# Patient Record
Sex: FEMALE | Race: WHITE | Employment: FULL TIME | URBAN - METROPOLITAN AREA
[De-identification: names, ages, dates, MRNs, and addresses within clinical notes are randomized per-mention and may not be internally consistent; named-entity substitution may affect disease eponyms.]

---

## 2017-04-11 ENCOUNTER — TELEPHONE (OUTPATIENT)
Dept: OBGYN | Facility: CLINIC | Age: 37
End: 2017-04-11

## 2017-04-11 DIAGNOSIS — R30.9 PAIN WITH URINATION: ICD-10-CM

## 2017-04-11 DIAGNOSIS — R30.9 PAIN WITH URINATION: Primary | ICD-10-CM

## 2017-04-11 LAB
ALBUMIN UR-MCNC: NEGATIVE MG/DL
APPEARANCE UR: CLEAR
BILIRUB UR QL STRIP: NEGATIVE
COLOR UR AUTO: YELLOW
GLUCOSE UR STRIP-MCNC: NEGATIVE MG/DL
HGB UR QL STRIP: ABNORMAL
KETONES UR STRIP-MCNC: NEGATIVE MG/DL
LEUKOCYTE ESTERASE UR QL STRIP: NEGATIVE
NITRATE UR QL: NEGATIVE
PH UR STRIP: 5.5 PH (ref 5–7)
RBC #/AREA URNS AUTO: ABNORMAL /HPF (ref 0–2)
SP GR UR STRIP: <=1.005 (ref 1–1.03)
URN SPEC COLLECT METH UR: ABNORMAL
UROBILINOGEN UR STRIP-ACNC: 0.2 EU/DL (ref 0.2–1)
WBC #/AREA URNS AUTO: ABNORMAL /HPF (ref 0–2)

## 2017-04-11 PROCEDURE — 81001 URINALYSIS AUTO W/SCOPE: CPT | Performed by: OBSTETRICS & GYNECOLOGY

## 2017-04-11 NOTE — PROGRESS NOTES
Results discussed directly with patient in clinic. Further details documented in the note.     Esme Rodríguez MD

## 2017-04-17 ENCOUNTER — OFFICE VISIT (OUTPATIENT)
Dept: FAMILY MEDICINE | Facility: CLINIC | Age: 37
End: 2017-04-17

## 2017-04-17 VITALS
BODY MASS INDEX: 25.48 KG/M2 | HEIGHT: 71 IN | SYSTOLIC BLOOD PRESSURE: 114 MMHG | TEMPERATURE: 98.7 F | RESPIRATION RATE: 20 BRPM | DIASTOLIC BLOOD PRESSURE: 70 MMHG | WEIGHT: 182 LBS | HEART RATE: 72 BPM

## 2017-04-17 DIAGNOSIS — B96.89 BACTERIAL VAGINOSIS: ICD-10-CM

## 2017-04-17 DIAGNOSIS — N76.0 BACTERIAL VAGINOSIS: ICD-10-CM

## 2017-04-17 DIAGNOSIS — J06.9 UPPER RESPIRATORY TRACT INFECTION, UNSPECIFIED TYPE: ICD-10-CM

## 2017-04-17 DIAGNOSIS — L29.9 CHRONIC PRURITUS: ICD-10-CM

## 2017-04-17 DIAGNOSIS — N30.00 ACUTE CYSTITIS WITHOUT HEMATURIA: Primary | ICD-10-CM

## 2017-04-17 LAB
ALBUMIN (URINE): ABNORMAL MG/DL
APPEARANCE UR: ABNORMAL
BACTERIA (WET PREP): ABNORMAL
BACTERIA, UR: ABNORMAL
BILIRUB UR QL: ABNORMAL
CASTS/LPF: ABNORMAL
CLUE CELLS: ABNORMAL
COLOR UR: YELLOW
EP/HPF: ABNORMAL
ERYTHROCYTE [DISTWIDTH] IN BLOOD BY AUTOMATED COUNT: 11.3 %
GLUCOSE URINE: ABNORMAL MG/DL
HCT VFR BLD AUTO: 38.2 % (ref 35–47)
HEMOGLOBIN: 12.1 G/DL (ref 11.7–15.7)
HGB UR QL: ABNORMAL
KETONES UR QL: ABNORMAL MG/DL
LEUKOCYTE ESTERASE - QUEST: ABNORMAL
MCH RBC QN AUTO: 28.4 PG (ref 26–33)
MCHC RBC AUTO-ENTMCNC: 31.7 G/DL (ref 31–36)
MCV RBC AUTO: 89.7 FL (ref 78–100)
MISC.: ABNORMAL
NITRITE UR QL STRIP: ABNORMAL
PH FLUID SOURCE: 6 (ref 3.5–4.5)
PH UR STRIP: 7 PH (ref 5–7)
PLATELET COUNT - QUEST: 310 10^9/L (ref 150–375)
PMNS (WET PREP): ABNORMAL
RBC # BLD AUTO: 4.26 10*12/L (ref 3.8–5.2)
RBC, UR MICRO: ABNORMAL (ref ?–2)
SP. GRAVITY: 1.01
TRICHOMONAS VAGINALS: ABNORMAL
UROBILINOGEN UR QL STRIP: 0.2 EU/DL (ref 0.2–1)
WBC # BLD AUTO: 8 10*9/L (ref 4–11)
WBC, UR MICRO: ABNORMAL (ref ?–2)
YEAST CELLS: ABNORMAL

## 2017-04-17 PROCEDURE — 84443 ASSAY THYROID STIM HORMONE: CPT | Mod: 90 | Performed by: PHYSICIAN ASSISTANT

## 2017-04-17 PROCEDURE — 87389 HIV-1 AG W/HIV-1&-2 AB AG IA: CPT | Mod: 90 | Performed by: PHYSICIAN ASSISTANT

## 2017-04-17 PROCEDURE — 36415 COLL VENOUS BLD VENIPUNCTURE: CPT | Performed by: PHYSICIAN ASSISTANT

## 2017-04-17 PROCEDURE — 80076 HEPATIC FUNCTION PANEL: CPT | Mod: 90 | Performed by: PHYSICIAN ASSISTANT

## 2017-04-17 PROCEDURE — 85027 COMPLETE CBC AUTOMATED: CPT | Performed by: PHYSICIAN ASSISTANT

## 2017-04-17 PROCEDURE — 99214 OFFICE O/P EST MOD 30 MIN: CPT | Performed by: PHYSICIAN ASSISTANT

## 2017-04-17 PROCEDURE — 80048 BASIC METABOLIC PNL TOTAL CA: CPT | Mod: 90 | Performed by: PHYSICIAN ASSISTANT

## 2017-04-17 PROCEDURE — 81001 URINALYSIS AUTO W/SCOPE: CPT | Performed by: PHYSICIAN ASSISTANT

## 2017-04-17 PROCEDURE — 87210 SMEAR WET MOUNT SALINE/INK: CPT | Performed by: PHYSICIAN ASSISTANT

## 2017-04-17 RX ORDER — NITROFURANTOIN 25; 75 MG/1; MG/1
100 CAPSULE ORAL 2 TIMES DAILY
Qty: 14 CAPSULE | Refills: 0 | Status: SHIPPED | OUTPATIENT
Start: 2017-04-17 | End: 2017-05-11

## 2017-04-17 RX ORDER — METRONIDAZOLE 500 MG/1
500 TABLET ORAL 2 TIMES DAILY
Qty: 14 TABLET | Refills: 0 | Status: SHIPPED | OUTPATIENT
Start: 2017-04-17 | End: 2017-05-11

## 2017-04-17 NOTE — PROGRESS NOTES
"Chief Complaint   Patient presents with     UTI     SUBJECTIVE  Marietta Cruz in for a possible UTI.  Symptoms started 1 1/2 week(s) ago. Symptoms include  dysuria, urgency, frequency, burning and cloudiness.   She denies fevers. She does have a history of uti's.  Today noticed some discharge.       Urgency continues daily, slight pain with urnation.       March menses for a week March 10th.     URI sx started a week ago, sore throat, nasal congestion. Improving      Of note 1 year of idiopathic itching of legs. Zantac helps but it makes her stomach feel uneasy.  No new exposures.  Itching to the point of bleeding.  OTC Antihistamines don't help.        Current Outpatient Prescriptions   Medication     norethindrone (MICRONOR) 0.35 MG per tablet     No current facility-administered medications for this visit.        Patient Active Problem List    Diagnosis Date Noted     Migraine aura without headache 11/17/2016     Priority: Medium     ACP (advance care planning) 06/30/2014     Priority: Medium     Advance Care Planning 7/30/2015: ACP Review and Resources Provided:  Reviewed chart for advance care plan.  Marietta Cruz has no plan or code status on file. Discussed available resources and provided with information. Confirmed code status reflects current choices pending further ACP discussions.  Confirmed/documented legally designated decision maker(s). [facility]     Priority: Low     State Tier Level:  Tier 1  Status:  n/a  Care Coordinator:      See Letters for [facility] Care Plan             OBJECTIVE:    /70  Pulse 72  Temp 98.7  F (37.1  C) (Oral)  Resp 20  Ht 1.81 m (5' 11.25\")  Wt 82.6 kg (182 lb)  LMP 03/10/2017  BMI 25.21 kg/m2    Patient is a 36 year old female, in no acute distress. Vitals noted   Head: Normocephalic, atraumatic.  Eyes: Conjunctiva clear, no discharge  Ears: External ears and TMs normal BL.  Nose: Nasal mucosa pink " and moist. No discharge.  Mouth / Throat: Mucous membranes moist. Normal dentition.  Pharynx non-erythematous, no exudates.   Neck: Supple, No thyromegaly or nodules. No lymphadenopathy.  Cardiac:  Normal rhythm and rate, no murmurs, rubs or gallops.  Lungs: clear to auscultation bilaterally; no wheezes, crackles or rhonchi  Abdomen:  Bowel sounds normal. Soft,  not distended, no masses, no hepatosplenomegaly, non-tender.  There is CVA tenderness. - mild on the left  Skin: legs there are discrete excortiated papules < 5 per leg    Labs Resulted Today:   Results for orders placed or performed in visit on 04/17/17   HCL  Urinalysis, Routine (BFP)   Result Value Ref Range    Color Urine Yellow     Appearance Urine Slightly Cloudy (A)     Glucose Urine Neg neg mg/dL    Bilirubin Urine Neg neg    Ketones Urine Neg neg mg/dL    Specific Gravity 1.010     Blood Urine Mod (A) neg    pH Urine 7.0 5.0 - 7.0 pH    Albumin Urine neg neg - neg mg/dL    Urobilinogen Urine 0.2 0.2 - 1.0 EU/dL    Nitrite Urine Neg NEG    Leukocyte Esterase MOD (A) neg - neg    Wbc, Urine Micro 8-15 (A) neg - 2    RBC Micro Urine 2-4 (A) neg - 2    EP/HPF small     Bacteria Urine small (A) neg - neg    Casts/LPF neg     Miscellaneous clumping of wbc's (A)    A WET PREP (BFP)   Result Value Ref Range    Trichomonas Vaginals neg     yeast cells neg     PMNs Wet Prep MOD (A)     Clue cells POS (A)     Bacteria (Wet Prep) HEAVY (A)     pH Fluid Source 6.0 (A) 3.5 - 4.5           ASSESSMENT/PLAN:    (N30.00) Acute cystitis without hematuria  (primary encounter diagnosis)  Plan: nitrofurantoin, macrocrystal-monohydrate,         (MACROBID) 100 MG capsule  AE reviewed    (J06.9) Upper respiratory tract infection, unspecified type  Call if sinusitis not gone 1 week for abx    (L29.9) Chronic pruritus  Plan: VENOUS COLLECTION, HEMOGRAM/PLATELET (BFP), TSH        with free T4 reflex ([facility]), HIV 1/2 Agn Melany         4th Gen w Reflex ([facility]), HEPATIC PANEL          ([facility]), BASIC METABOLIC PANEL ([facility])  Advised chest xray.  If labs nl will RTC for chest xray.  If neg will have her see Dr. Diaz    (N76.0,  B96.89) Bacterial vaginosis  Plan: metroNIDAZOLE (FLAGYL) 500 MG tablet        No alcohol, no intercourse  Take AFTER MACROBID TX         Marietta Cruz understands and agrees with the plan.  Nichelle Luna

## 2017-04-17 NOTE — NURSING NOTE
Mohania SHERRY Cruz is here for possible bladder issues. Had a UA done last week and was normal, but still has pain and frequency at times when urinating.  Questioned patient about current smoking habits.  Pt. has never smoked.  Body mass index is 25.89 kg/(m^2).  PULSE regular  My Chart: active  CLASSIFICATION OF OVERWEIGHT AND OBESITY BY BMI                        Obesity Class           BMI(kg/m2)  Underweight                                    < 18.5  Normal                                         18.5-24.9  Overweight                                     25.0-29.9  OBESITY                     I                  30.0-34.9                             II                 35.0-39.9  EXTREME OBESITY             III                >40                            Patient's  BMI Body mass index is 25.21 kg/(m^2).  http://hin.nhlbi.nih.gov/menuplanner/menu.cgi    Pre-visit planning  Immunizations - up to date  Colonoscopy -   Mammogram -   Asthma -   PHQ9 -    MIRI-7 -

## 2017-04-17 NOTE — PATIENT INSTRUCTIONS
1. Macrobid (nitrofurantoin) 2 x a day for 7 day for UTI    Then    2. Metronidazole 2 x a day for 7 day.  NO ALCOHOL  NO INTERCOURSE

## 2017-04-17 NOTE — MR AVS SNAPSHOT
After Visit Summary   4/17/2017    Marietta Cruz    MRN: [id number]           Patient Information     Date Of Birth          1980        Visit Information        Provider Department      4/17/2017 2:45 PM Nichelle Luna PA [facility]        Today's Diagnoses     Acute cystitis without hematuria    -  1    Upper respiratory tract infection, unspecified type        Chronic pruritus        Bacterial vaginosis          Care Instructions    1. Macrobid (nitrofurantoin) 2 x a day for 7 day for UTI    Then    2. Metronidazole 2 x a day for 7 day.  NO ALCOHOL  NO INTERCOURSE        Follow-ups after your visit        Who to contact     If you have questions or need follow up information about today's clinic visit or your schedule please contact [facility] directly at [phone].  Normal or non-critical lab and imaging results will be communicated to you by Eko Deviceshart, letter or phone within 4 business days after the clinic has received the results. If you do not hear from us within 7 days, please contact the clinic through E[employer] Deviceshart or phone. If you have a critical or abnormal lab result, we will notify you by phone as soon as possible.  Submit refill requests through [employer] or call your pharmacy and they will forward the refill request to us. Please allow 3 business days for your refill to be completed.          Additional Information About Your Visit        MyChart Information     [employer] gives you secure access to your electronic health record. If you see a primary care provider, you can also send messages to your care team and make appointments. If you have questions, please call your primary care clinic.  If you do not have a primary care provider, please call [phone] and they will assist you.        Care EveryWhere ID     This is your Care EveryWhere ID. This could be used by other organizations to access your [facility]  "records  [id number]        Your Vitals Were     Pulse Temperature Respirations Height Last Period BMI (Body Mass Index)    72 98.7  F (37.1  C) (Oral) 20 1.81 m (5' 11.25\") 03/10/2017 25.21 kg/m2       Blood Pressure from Last 3 Encounters:   04/17/17 114/70   11/17/16 110/74   06/08/16 102/70    Weight from Last 3 Encounters:   04/17/17 82.6 kg (182 lb)   11/17/16 81.6 kg (180 lb)   06/08/16 79.4 kg (175 lb)              We Performed the Following     A WET PREP (BFP)     BASIC METABOLIC PANEL ([facility])     HCL  Urinalysis, Routine (BFP)     HEMOGRAM/PLATELET (BFP)     HEPATIC PANEL ([facility])     HIV 1/2 Agn Melany 4th Gen w Reflex ([facility])     TSH with free T4 reflex ([facility])     VENOUS COLLECTION          Today's Medication Changes          These changes are accurate as of: 4/17/17  3:47 PM.  If you have any questions, ask your nurse or doctor.               Start taking these medicines.        Dose/Directions    metroNIDAZOLE 500 MG tablet   Commonly known as:  FLAGYL   Used for:  Bacterial vaginosis   Started by:  Nichelle Luna PA        Dose:  500 mg   Take 1 tablet (500 mg) by mouth 2 times daily   Quantity:  14 tablet   Refills:  0       nitrofurantoin (macrocrystal-monohydrate) 100 MG capsule   Commonly known as:  MACROBID   Used for:  Acute cystitis without hematuria   Started by:  Nichelle Luna PA        Dose:  100 mg   Take 1 capsule (100 mg) by mouth 2 times daily   Quantity:  14 capsule   Refills:  0            Where to get your medicines      These medications were sent to [facility] [place], [place] - [address]  [address] [facility] [address]     Phone:  [phone]     metroNIDAZOLE 500 MG tablet    nitrofurantoin (macrocrystal-monohydrate) 100 MG capsule                Primary Care Provider Office Phone # Fax #    Spencer Lott [phone]       [facility] [address]  [facility] [address]        Thank you!     Thank you for choosing [facility]  for your care. Our goal is always to provide you with excellent care. Hearing back from our patients is one way we can continue to improve our services. Please take a few minutes to complete the written survey that you may receive in the mail after your visit with us. Thank you!             Your Updated Medication List - Protect others around you: Learn how to safely use, store and throw away your medicines at www.disposemymeds.org.          This list is accurate as of: 4/17/17  3:47 PM.  Always use your most recent med list.                   Brand Name Dispense Instructions for use    metroNIDAZOLE 500 MG tablet    FLAGYL    14 tablet    Take 1 tablet (500 mg) by mouth 2 times daily       nitrofurantoin (macrocrystal-monohydrate) 100 MG capsule    MACROBID    14 capsule    Take 1 capsule (100 mg) by mouth 2 times daily       norethindrone 0.35 MG per tablet    MICRONOR    84 tablet    Take 1 tablet (0.35 mg) by mouth daily

## 2017-04-18 LAB
ALBUMIN SERPL-MCNC: 3.7 G/DL (ref 3.6–5.1)
ALBUMIN/GLOB SERPL: 1.3 (CALC) (ref 1–2.5)
ALP SERPL-CCNC: 45 U/L (ref 33–115)
ALT SERPL-CCNC: 21 U/L (ref 6–29)
AST SERPL-CCNC: 22 U/L (ref 10–30)
BILIRUB SERPL-MCNC: 0.6 MG/DL (ref 0.2–1.2)
BILIRUBIN, DIRECT: 0.1 MG/DL (ref 0–0.5)
BILIRUBIN,INDIRECT: 0.5 MG/DL (CALC) (ref 0.2–1.2)
BUN SERPL-MCNC: 9 MG/DL (ref 7–25)
BUN/CREATININE RATIO: NORMAL (CALC) (ref 6–22)
CALCIUM SERPL-MCNC: 8.7 MG/DL (ref 8.6–10.2)
CHLORIDE SERPLBLD-SCNC: 101 MMOL/L (ref 98–110)
CO2 SERPL-SCNC: 25 MMOL/L (ref 20–31)
CREAT SERPL-MCNC: 0.98 MG/DL (ref 0.5–1.1)
EGFR AFRICAN AMERICAN - QUEST: 85 ML/MIN/1.73M2
GFR SERPL CREATININE-BSD FRML MDRD: 74 ML/MIN/1.73M2
GLOBULIN, CALCULATED - QUEST: 2.9 G/DL (CALC) (ref 1.9–3.7)
GLUCOSE - QUEST: 89 MG/DL (ref 65–99)
HIV 1/2 AGN ABY 4TH GEN WITH REFLEX: NORMAL
POTASSIUM SERPL-SCNC: 3.9 MMOL/L (ref 3.5–5.3)
PROT SERPL-MCNC: 6.6 G/DL (ref 6.1–8.1)
SODIUM SERPL-SCNC: 137 MMOL/L (ref 135–146)
TSH SERPL-ACNC: 1.8 MIU/L

## 2017-05-11 ENCOUNTER — OFFICE VISIT (OUTPATIENT)
Dept: FAMILY MEDICINE | Facility: CLINIC | Age: 37
End: 2017-05-11

## 2017-05-11 VITALS
SYSTOLIC BLOOD PRESSURE: 98 MMHG | DIASTOLIC BLOOD PRESSURE: 72 MMHG | BODY MASS INDEX: 25.48 KG/M2 | HEIGHT: 71 IN | RESPIRATION RATE: 20 BRPM | WEIGHT: 182 LBS

## 2017-05-11 DIAGNOSIS — L29.9 PRURITIC DISORDER: Primary | ICD-10-CM

## 2017-05-11 PROCEDURE — 71020 XR CHEST 2 VW: CPT | Performed by: PHYSICIAN ASSISTANT

## 2017-05-11 PROCEDURE — 99213 OFFICE O/P EST LOW 20 MIN: CPT | Performed by: PHYSICIAN ASSISTANT

## 2017-05-11 NOTE — MR AVS SNAPSHOT
"              After Visit Summary   5/11/2017    Marietta Cruz    MRN: [id number]           Patient Information     Date Of Birth          1980        Visit Information        Provider Department      5/11/2017 5:00 PM Nichelle Luna PA [facility]        Today's Diagnoses     Pruritic disorder    -  1       Follow-ups after your visit        Who to contact     If you have questions or need follow up information about today's clinic visit or your schedule please contact [facility] directly at [phone].  Normal or non-critical lab and imaging results will be communicated to you by Blackberryhart, letter or phone within 4 business days after the clinic has received the results. If you do not hear from us within 7 days, please contact the clinic through Blackberryhart or phone. If you have a critical or abnormal lab result, we will notify you by phone as soon as possible.  Submit refill requests through [employer]ree or call your pharmacy and they will forward the refill request to us. Please allow 3 business days for your refill to be completed.          Additional Information About Your Visit        MyChart Information     [employer] gives you secure access to your electronic health record. If you see a primary care provider, you can also send messages to your care team and make appointments. If you have questions, please call your primary care clinic.  If you do not have a primary care provider, please call [phone] and they will assist you.        Care EveryWhere ID     This is your Care EveryWhere ID. This could be used by other organizations to access your [facility] medical records  [id number]        Your Vitals Were     Respirations Height Last Period BMI (Body Mass Index)          20 1.81 m (5' 11.25\") 03/10/2017 25.21 kg/m2         Blood Pressure from Last 3 Encounters:   05/11/17 98/72   04/17/17 114/70   11/17/16 110/74    Weight from Last 3 Encounters: "   05/11/17 82.6 kg (182 lb)   04/17/17 82.6 kg (182 lb)   11/17/16 81.6 kg (180 lb)              We Performed the Following     XR Chest 2 Views        Primary Care Provider Office Phone # Fax #    Spencer Lott [phone]       [facility] [address] [facility] [address]  [facility] [address]        Thank you!     Thank you for choosing [facility]  for your care. Our goal is always to provide you with excellent care. Hearing back from our patients is one way we can continue to improve our services. Please take a few minutes to complete the written survey that you may receive in the mail after your visit with us. Thank you!             Your Updated Medication List - Protect others around you: Learn how to safely use, store and throw away your medicines at www.disposemymeds.org.          This list is accurate as of: 5/11/17 11:59 PM.  Always use your most recent med list.                   Brand Name Dispense Instructions for use    norethindrone 0.35 MG per tablet    MICRONOR    84 tablet    Take 1 tablet (0.35 mg) by mouth daily

## 2017-05-11 NOTE — PROGRESS NOTES
SUBJECTIVE:                                                    Marietta Cruz is a 36 year old female who presents to clinic today for the following health issues:    See last OV.  Chronic pruritis of lower legs that started last week in April from thighs to ankles.  She additionally had sensitivity to suprapubic area - that has resolved.  Itching continues    Chest Xray needs to be done as part of comprenehisve workup    Zantac - didn't change itching however started loratadine in the last week that has helped    ROS:   C: NEGATIVE for fever, chills, change in weight  E: NEGATIVE for vision changes or irritation  E/M: NEGATIVE for ear, mouth and throat problems  R: NEGATIVE for significant cough or SOB  CV: NEGATIVE for chest pain, palpitations or peripheral edema  GI: NEGATIVE for nausea, abdominal pain, heartburn, or change in bowel habits  : NEGATIVE for frequency, dysuria, or hematuria        Labs reviewed in EPIC  BP Readings from Last 3 Encounters:   05/11/17 98/72   04/17/17 114/70   11/17/16 110/74    Wt Readings from Last 3 Encounters:   05/11/17 82.6 kg (182 lb)   04/17/17 82.6 kg (182 lb)   11/17/16 81.6 kg (180 lb)                  Patient Active Problem List   Diagnosis     Health Care Home     ACP (advance care planning)     Migraine aura without headache     Past Surgical History:   Procedure Laterality Date     RW OTHER (ABSTRACTED)  1999 & 2001    wisdom teeth       Social History   Substance Use Topics     Smoking status: Never Smoker     Smokeless tobacco: Never Used     Alcohol use 0.0 oz/week     0 Standard drinks or equivalent per week      Comment: 1 per month     Family History   Problem Relation Age of Onset     Hypertension Mother      DIABETES Maternal Grandfather      HEART DISEASE Maternal Grandfather      CANCER Paternal Aunt 50     breast     Respiratory Father      history asthma     CANCER Maternal Grandmother      uterine?     Other - See Comments Maternal Grandmother      Mult.  "My.          Current Outpatient Prescriptions   Medication Sig Dispense Refill     norethindrone (MICRONOR) 0.35 MG per tablet Take 1 tablet (0.35 mg) by mouth daily 84 tablet 3     Allergies   Allergen Reactions     No Known Allergies      Recent Labs   Lab Test  04/17/17   1609  06/30/15   0722  02/23/12   1422   LDL   --   64   --    HDL   --   64  52   TRIG   --   133   --    ALT  21   --   15   CR  0.98   --   1.06   GFRESTIMATED  74   --   69   POTASSIUM  3.9   --   3.7   TSH  1.80   --   1.95              OBJECTIVE:   BP 98/72 (BP Location: Right arm, Patient Position: Chair, Cuff Size: Adult Regular)  Resp 20  Ht 1.81 m (5' 11.25\")  Wt 82.6 kg (182 lb)  LMP 03/10/2017  BMI 25.21 kg/m2   Body mass index is 25.21 kg/(m^2).   GENERAL: healthy, alert and no distress    Skin: Examined legs bilaterally as well as feet.  Skin without ecchymosis, erythema or swelling.    My review of the chest x-ray shows normal lung volume, no infiltrates, no pleural effusion, no masses and normal heart size. Discussed with patient that radiologist will read the images and if abnormality is seen we will notify patient.        ASSESSMENT/PLAN:                                                        ICD-10-CM    1. Pruritic disorder L29.9 XR Chest 2 Views         Xray rad pending.  Continue loratadine  Call me with update - if not resolved in a few weeks recommend Allergy consult          CHARLIE Roldan  [facility]    "

## 2017-11-29 ENCOUNTER — MYC MEDICAL ADVICE (OUTPATIENT)
Dept: FAMILY MEDICINE | Facility: CLINIC | Age: 37
End: 2017-11-29

## 2017-11-29 DIAGNOSIS — Z01.419 ENCOUNTER FOR GYNECOLOGICAL EXAMINATION WITHOUT ABNORMAL FINDING: Primary | ICD-10-CM

## 2017-11-29 NOTE — TELEPHONE ENCOUNTER
Bere Rizzo, [facility] 11/29/2017 12:02 PM CST        ----- Message -----   From: Marietta Cruz   Sent: 11/29/2017 9:36 AM   To: [facility] Nurse Pool  Subject: Question about an upcoming visit     Brian Steward,     Can you put in an order for my labs if I need fasting labs before my appointment for my annual visit Dec 6 please?      Thanks,   Marietta

## 2017-12-01 DIAGNOSIS — Z01.419 ENCOUNTER FOR GYNECOLOGICAL EXAMINATION WITHOUT ABNORMAL FINDING: ICD-10-CM

## 2017-12-01 LAB
CHOLEST SERPL-MCNC: 149 MG/DL
GLUCOSE SERPL-MCNC: 78 MG/DL (ref 70–99)
HDLC SERPL-MCNC: 52 MG/DL
LDLC SERPL CALC-MCNC: 78 MG/DL
NONHDLC SERPL-MCNC: 97 MG/DL
TRIGL SERPL-MCNC: 97 MG/DL

## 2017-12-01 PROCEDURE — 36415 COLL VENOUS BLD VENIPUNCTURE: CPT | Performed by: INTERNAL MEDICINE

## 2017-12-01 PROCEDURE — 82947 ASSAY GLUCOSE BLOOD QUANT: CPT | Performed by: INTERNAL MEDICINE

## 2017-12-01 PROCEDURE — 80061 LIPID PANEL: CPT | Performed by: INTERNAL MEDICINE

## 2017-12-07 ENCOUNTER — OFFICE VISIT (OUTPATIENT)
Dept: FAMILY MEDICINE | Facility: CLINIC | Age: 37
End: 2017-12-07

## 2017-12-07 VITALS
SYSTOLIC BLOOD PRESSURE: 120 MMHG | HEART RATE: 61 BPM | BODY MASS INDEX: 24.84 KG/M2 | WEIGHT: 177.4 LBS | HEIGHT: 71 IN | OXYGEN SATURATION: 99 % | DIASTOLIC BLOOD PRESSURE: 70 MMHG

## 2017-12-07 DIAGNOSIS — N76.0 BACTERIAL VAGINOSIS: ICD-10-CM

## 2017-12-07 DIAGNOSIS — B96.89 BACTERIAL VAGINOSIS: ICD-10-CM

## 2017-12-07 DIAGNOSIS — Z01.419 ENCOUNTER FOR GYNECOLOGICAL EXAMINATION WITHOUT ABNORMAL FINDING: Primary | ICD-10-CM

## 2017-12-07 DIAGNOSIS — Z30.41 SURVEILLANCE OF PREVIOUSLY PRESCRIBED CONTRACEPTIVE PILL: ICD-10-CM

## 2017-12-07 LAB
BACTERIA (WET PREP): ABNORMAL
CLUE CELLS: ABNORMAL
PH FLUID SOURCE: 6 (ref 3.5–4.5)
PMNS (WET PREP): ABNORMAL
TRICHOMONAS VAGINALS: ABNORMAL
YEAST CELLS: ABNORMAL

## 2017-12-07 PROCEDURE — 99395 PREV VISIT EST AGE 18-39: CPT | Performed by: PHYSICIAN ASSISTANT

## 2017-12-07 PROCEDURE — 87070 CULTURE OTHR SPECIMN AEROBIC: CPT | Mod: 90 | Performed by: PHYSICIAN ASSISTANT

## 2017-12-07 PROCEDURE — 87210 SMEAR WET MOUNT SALINE/INK: CPT | Performed by: PHYSICIAN ASSISTANT

## 2017-12-07 RX ORDER — ACETAMINOPHEN AND CODEINE PHOSPHATE 120; 12 MG/5ML; MG/5ML
1 SOLUTION ORAL DAILY
Qty: 84 TABLET | Refills: 3 | Status: SHIPPED | OUTPATIENT
Start: 2017-12-07 | End: 2022-09-20

## 2017-12-07 RX ORDER — METRONIDAZOLE 500 MG/1
500 TABLET ORAL 2 TIMES DAILY
Qty: 14 TABLET | Refills: 0 | Status: SHIPPED | OUTPATIENT
Start: 2017-12-07 | End: 2018-04-19

## 2017-12-07 NOTE — PROGRESS NOTES
SUBJECTIVE:     CC: Mareitta Cruz is an 37 year old woman who presents for preventive health visit.     Healthy Habits:      Amount of exercise or daily activities, outside of work: cardio/weights    Problems taking medications regularly No    Medication side effects: No    Have you had an eye exam in the past two years? yes    Do you see a dentist twice per year? yes      A week ago had vaginal discharge and tingling  LMP - irregular     Today's PHQ-2 Score:   PHQ-2 ( 1999 Pf[employer]) 12/7/2017 11/17/2016   Q1: Little interest or pleasure in doing things 0 0   Q2: Feeling down, depressed or hopeless 0 0   PHQ-2 Score 0 0           Do you feel safe in your environment -  Yes    Social History   Substance Use Topics     Smoking status: Never Smoker     Smokeless tobacco: Never Used     Alcohol use 0.0 oz/week     0 Standard drinks or equivalent per week      Comment: 1 per month       The patient does not drink >3 drinks per day nor >7 drinks per week.    Recent Labs   Lab Test  12/01/17   0717  06/30/15   0722   CHOL  149  155   HDL  52  64   LDL  78  64   TRIG  97  133   CHOLHDLRATIO   --   2.4   NHDL  97   --          Reviewed orders with patient.  Reviewed health maintenance and updated orders accordingly - Yes    Labs reviewed in EPIC  BP Readings from Last 3 Encounters:   12/07/17 120/70   05/11/17 98/72   04/17/17 114/70    Wt Readings from Last 3 Encounters:   12/07/17 80.5 kg (177 lb 6.4 oz)   05/11/17 82.6 kg (182 lb)   04/17/17 82.6 kg (182 lb)                  Patient Active Problem List   Diagnosis     Health Care Home     ACP (advance care planning)     Migraine aura without headache     Past Surgical History:   Procedure Laterality Date     LEEP TX, CERVICAL  2005     RW OTHER (ABSTRACTED)  1999 & 2001    wisdom teeth       Social History   Substance Use Topics     Smoking status: Never Smoker     Smokeless tobacco: Never Used     Alcohol use 0.0 oz/week     0 Standard drinks or equivalent per week       Comment: 1 per month     Family History   Problem Relation Age of Onset     Respiratory Father      history asthma     Hypertension Mother      DIABETES Maternal Grandfather      HEART DISEASE Maternal Grandfather      CANCER Paternal Aunt 50     breast     CANCER Maternal Grandmother      uterine?     Other - See Comments Maternal Grandmother      Mult. My.      Osteoarthritis Paternal Grandmother          Current Outpatient Prescriptions   Medication Sig Dispense Refill     metroNIDAZOLE (FLAGYL) 500 MG tablet Take 1 tablet (500 mg) by mouth 2 times daily 14 tablet 0     norethindrone (MICRONOR) 0.35 MG per tablet Take 1 tablet (0.35 mg) by mouth daily 84 tablet 3     Allergies   Allergen Reactions     No Known Allergies                Mammo Decision Support:  Mammogram not appropriate for this patient based on age.  Pertinent mammograms are reviewed under the imaging tab.    History of abnormal Pap smear: YES - updated in Problem List and Health Maintenance accordingly    All Histories reviewed and updated in Epic.  Past Medical History:   Diagnosis Date     NO ACTIVE PROBLEMS       Past Surgical History:   Procedure Laterality Date     LEEP TX, CERVICAL  2005     RW OTHER (ABSTRACTED)  1999 & 2001    wisdom teeth       ROS:  C: NEGATIVE for fever, chills, change in weight  I: NEGATIVE for worrisome rashes, moles or lesions  E: NEGATIVE for vision changes or irritation  ENT: NEGATIVE for ear, mouth and throat problems  R: NEGATIVE for significant cough or SOB  B: NEGATIVE for masses, tenderness or discharge  CV: NEGATIVE for chest pain, palpitations or peripheral edema  GI: NEGATIVE for nausea, abdominal pain, heartburn, or change in bowel habits  : NEGATIVE for unusual urinary sx. Periods are irreg.   M: NEGATIVE for significant arthralgias or myalgia  N: NEGATIVE for weakness, dizziness or paresthesias  P: NEGATIVE for changes in mood or affect    Problem list, Medication list, Allergies, and  Medical/Social/Surgical histories reviewed in [facility] and updated as appropriate.    Labs reviewed in EPIC    BP Readings from Last 3 Encounters:   12/07/17 120/70   05/11/17 98/72   04/17/17 114/70    Wt Readings from Last 3 Encounters:   12/07/17 80.5 kg (177 lb 6.4 oz)   05/11/17 82.6 kg (182 lb)   04/17/17 82.6 kg (182 lb)                  Patient Active Problem List   Diagnosis     Health Care Home     ACP (advance care planning)     Migraine aura without headache     Past Surgical History:   Procedure Laterality Date     LEEP TX, CERVICAL  2005     RW OTHER (ABSTRACTED)  1999 & 2001    wisdom teeth       Social History   Substance Use Topics     Smoking status: Never Smoker     Smokeless tobacco: Never Used     Alcohol use 0.0 oz/week     0 Standard drinks or equivalent per week      Comment: 1 per month     Family History   Problem Relation Age of Onset     Respiratory Father      history asthma     Hypertension Mother      DIABETES Maternal Grandfather      HEART DISEASE Maternal Grandfather      CANCER Paternal Aunt 50     breast     CANCER Maternal Grandmother      uterine?     Other - See Comments Maternal Grandmother      Mult. My.      Osteoarthritis Paternal Grandmother          Current Outpatient Prescriptions   Medication Sig Dispense Refill     metroNIDAZOLE (FLAGYL) 500 MG tablet Take 1 tablet (500 mg) by mouth 2 times daily 14 tablet 0     norethindrone (MICRONOR) 0.35 MG per tablet Take 1 tablet (0.35 mg) by mouth daily 84 tablet 3     [DISCONTINUED] norethindrone (MICRONOR) 0.35 MG per tablet Take 1 tablet (0.35 mg) by mouth daily 84 tablet 3     Allergies   Allergen Reactions     No Known Allergies      Recent Labs   Lab Test  12/01/17   0717  04/17/17   1609  06/30/15   0722  02/23/12   1422   LDL  78   --   64   --    HDL  52   --   64  52   TRIG  97   --   133   --    ALT   --   21   --   15   CR   --   0.98   --   1.06   GFRESTIMATED   --   74   --   69   POTASSIUM   --   3.9   --   3.7   TSH   " --   1.80   --   1.95        OBJECTIVE:     /70 (BP Location: Left arm, Patient Position: Chair, Cuff Size: Adult Large)  Pulse 61  Ht 1.803 m (5' 11\")  Wt 80.5 kg (177 lb 6.4 oz)  LMP 11/30/2017  SpO2 99%  Breastfeeding? No  BMI 24.74 kg/m2  EXAM:  GENERAL: healthy, alert and no distress  EYES: Eyes grossly normal to inspection, PERRL and conjunctivae and sclerae normal  HENT: ear canals and TM's normal, nose and mouth without ulcers or lesions  NECK: no adenopathy, no asymmetry, masses, or scars and thyroid normal to palpation  RESP: lungs clear to auscultation - no rales, rhonchi or wheezes  BREAST: normal without masses, tenderness or nipple discharge and no palpable axillary masses or adenopathy  CV: regular rate and rhythm, normal S1 S2, no S3 or S4, no murmur, click or rub, no peripheral edema   ABDOMEN: soft, nontender, no hepatosplenomegaly, no masses and bowel sounds normal   (female): normal female external genitalia, normal urethral meatus, vaginal mucosa pink, moist, well rugated, and normal cervix/adnexa/uterus without masses  + for thick white discharge  MS: no gross musculoskeletal defects noted, no edema  SKIN: no suspicious lesions or rashes  NEURO: Normal strength and tone, mentation intact and speech normal  PSYCH: mentation appears normal, affect normal/bright    ASSESSMENT/PLAN:     1. Encounter for gynecological examination without abnormal finding    2. Bacterial vaginosis  Metronidazole for BV. Advised AE of metronidazole including nausea, vomiting, stomach upset and diarrhea  Advised absolute abstinence from alcohol and sexual intercourse while on this medication.      - Genital Culture Aerobic Bacterial  - metroNIDAZOLE (FLAGYL) 500 MG tablet; Take 1 tablet (500 mg) by mouth 2 times daily  Dispense: 14 tablet; Refill: 0  - A WET PREP (BFP)    3. Surveillance of previously prescribed contraceptive pill    - norethindrone (MICRONOR) 0.35 MG per tablet; Take 1 tablet (0.35 mg) " "by mouth daily  Dispense: 84 tablet; Refill: 3    COUNSELING:     Special attention given to:        Regular exercise       Healthy diet/nutrition       Vision screening       Hearing screening         Alcohol Use         Contraception       Safe sex practices/STD prevention         Osteoporosis Prevention/Bone Health       Colon cancer screening       Consider Hep C screening for patients born between 1945 and 1965      Blood Pressure.  BP Readings from Last 6 Encounters:   12/07/17 120/70   05/11/17 98/72   04/17/17 114/70   11/17/16 110/74   06/08/16 102/70   07/30/15 114/74              reports that she has never smoked. She has never used smokeless tobacco.    Estimated body mass index is 24.74 kg/(m^2) as calculated from the following:    Height as of this encounter: 1.803 m (5' 11\").    Weight as of this encounter: 80.5 kg (177 lb 6.4 oz).         Counseling Resources:  ATP IV Guidelines  Pooled Cohorts Equation Calculator  Breast Cancer Risk Calculator  FRAX Risk Assessment  ICSI Preventive Guidelines  Dietary Guidelines for Americans, 2010  USDA's MyPlate  ASA Prophylaxis  Lung CA Screening    CHARLIE Roldan  [facility]  "

## 2017-12-07 NOTE — MR AVS SNAPSHOT
"              After Visit Summary   12/7/2017    Marietta Cruz    MRN: [id number]           Patient Information     Date Of Birth          1980        Visit Information        Provider Department      12/7/2017 6:00 PM Nichelle Luna PA [facility]        Today's Diagnoses     Encounter for gynecological examination without abnormal finding    -  1    Bacterial vaginosis        Surveillance of previously prescribed contraceptive pill           Follow-ups after your visit        Who to contact     If you have questions or need follow up information about today's clinic visit or your schedule please contact [facility] directly at [phone].  Normal or non-critical lab and imaging results will be communicated to you by MyChart, letter or phone within 4 business days after the clinic has received the results. If you do not hear from us within 7 days, please contact the clinic through [employer] or phone. If you have a critical or abnormal lab result, we will notify you by phone as soon as possible.  Submit refill requests through [employer] or call your pharmacy and they will forward the refill request to us. Please allow 3 business days for your refill to be completed.          Additional Information About Your Visit        MyChart Information     [employer] gives you secure access to your electronic health record. If you see a primary care provider, you can also send messages to your care team and make appointments. If you have questions, please call your primary care clinic.  If you do not have a primary care provider, please call [phone] and they will assist you.        Care EveryWhere ID     This is your Care EveryWhere ID. This could be used by other organizations to access your [place] medical records  [id number]        Your Vitals Were     Pulse Height Last Period Pulse Oximetry Breastfeeding? BMI (Body Mass Index)    61 1.803 m (5' 11\") " 11/30/2017 99% No 24.74 kg/m2       Blood Pressure from Last 3 Encounters:   12/07/17 120/70   05/11/17 98/72   04/17/17 114/70    Weight from Last 3 Encounters:   12/07/17 80.5 kg (177 lb 6.4 oz)   05/11/17 82.6 kg (182 lb)   04/17/17 82.6 kg (182 lb)              We Performed the Following     A WET PREP (BFP)     Genital Culture Aerobic Bacterial          Today's Medication Changes          These changes are accurate as of: 12/7/17 11:59 PM.  If you have any questions, ask your nurse or doctor.               Start taking these medicines.        Dose/Directions    metroNIDAZOLE 500 MG tablet   Commonly known as:  FLAGYL   Used for:  Bacterial vaginosis   Started by:  Nichelle Luna PA        Dose:  500 mg   Take 1 tablet (500 mg) by mouth 2 times daily   Quantity:  14 tablet   Refills:  0            Where to get your medicines      These medications were sent to [facility] [place] [address]  [address] [place] [id number]     Phone:  [phone]     metroNIDAZOLE 500 MG tablet    norethindrone 0.35 MG per tablet                Primary Care Provider Office Phone # Fax #    Spencer Lott -[phone] [address]  [facility] [address]        Equal Access to Services     [facility]: Hadii kehinde ku hadasho Soraniali, waaxda luqadaha, qaybta kaalmada adeegyada, brian doherty hayelayne hobbs . So [facility] [phone].    ATENCIÓN: Si habla español, tiene a hernadez disposición servicios gratuitos de asistencia lingüística. Hilda al [phone].    We comply with applicable federal civil rights laws and [place] laws. We do not discriminate on the basis of race, color, national origin, age, disability, sex, sexual orientation, or gender identity.            Thank you!     Thank you for choosing [facility]  for your care. Our goal is always to provide you with excellent care. Hearing  back from our patients is one way we can continue to improve our services. Please take a few minutes to complete the written survey that you may receive in the mail after your visit with us. Thank you!             Your Updated Medication List - Protect others around you: Learn how to safely use, store and throw away your medicines at www.disposemymeds.org.          This list is accurate as of: 12/7/17 11:59 PM.  Always use your most recent med list.                   Brand Name Dispense Instructions for use Diagnosis    metroNIDAZOLE 500 MG tablet    FLAGYL    14 tablet    Take 1 tablet (500 mg) by mouth 2 times daily    Bacterial vaginosis       norethindrone 0.35 MG per tablet    MICRONOR    84 tablet    Take 1 tablet (0.35 mg) by mouth daily    Surveillance of previously prescribed contraceptive pill

## 2017-12-08 NOTE — NURSING NOTE
Mohania is here for a PX    Patient is here for a full physical exam.    Pre-Visit Screening :  Immunizations : up to date  Colon Screening : is up to date  Mammogram: NA  Asthma Action Test/Plan : NA  PHQ9/GAD7 :  phq2      Vitals:  Pulse - regular  My Chart - accepts    CLASSIFICATION OF OVERWEIGHT AND OBESITY BY BMI                         Obesity Class           BMI(kg/m2)  Underweight                                    < 18.5  Normal                                         18.5-24.9  Overweight                                     25.0-29.9  OBESITY                     I                  30.0-34.9                              II                 35.0-39.9  EXTREME OBESITY             III                >40                             Patient's  BMI There is no height or weight on file to calculate BMI.  http://hin.nhlbi.nih.gov/menuplanner/menu.cgi  Questioned patient about current smoking habits.  Pt. has never smoked.    ETOH screening:  Questions:  1-How often do you have a drink containing alcohol?                             1 times per week(s)  2-How many drinks containing alcohol do you have on a typical day when you are         Drinking?                              2   3- How often do you have 5 or more drinks on one occasion?                              0 per months    Have you ever:  None of the patient's responses to the CAGE screening were positive / Negative CAGE score       The patient has verbalized that it is ok to leave a detailed voice message on the patient's cell phone with results/recommendations from this visit.       Verified [phone] phone number:     Cassy Pillai MA

## 2017-12-11 ENCOUNTER — TELEPHONE (OUTPATIENT)
Dept: FAMILY MEDICINE | Facility: CLINIC | Age: 37
End: 2017-12-11

## 2017-12-11 DIAGNOSIS — B37.31 YEAST INFECTION OF THE VAGINA: Primary | ICD-10-CM

## 2017-12-11 LAB — CULTURE - QUEST: NORMAL

## 2017-12-11 RX ORDER — FLUCONAZOLE 150 MG/1
150 TABLET ORAL ONCE
Qty: 1 TABLET | Refills: 0 | Status: SHIPPED | OUTPATIENT
Start: 2017-12-11 | End: 2017-12-11

## 2017-12-13 ENCOUNTER — ALLIED HEALTH/NURSE VISIT (OUTPATIENT)
Dept: NURSING | Facility: CLINIC | Age: 37
End: 2017-12-13
Payer: COMMERCIAL

## 2017-12-13 DIAGNOSIS — J02.0 ACUTE STREPTOCOCCAL PHARYNGITIS: Primary | ICD-10-CM

## 2017-12-13 LAB
DEPRECATED S PYO AG THROAT QL EIA: NORMAL
SPECIMEN SOURCE: NORMAL

## 2017-12-13 PROCEDURE — 87081 CULTURE SCREEN ONLY: CPT | Performed by: OBSTETRICS & GYNECOLOGY

## 2017-12-13 PROCEDURE — 87880 STREP A ASSAY W/OPTIC: CPT | Performed by: OBSTETRICS & GYNECOLOGY

## 2017-12-14 LAB
BACTERIA SPEC CULT: NORMAL
SPECIMEN SOURCE: NORMAL

## 2018-04-19 ENCOUNTER — OFFICE VISIT (OUTPATIENT)
Dept: FAMILY MEDICINE | Facility: CLINIC | Age: 38
End: 2018-04-19

## 2018-04-19 VITALS
BODY MASS INDEX: 24.41 KG/M2 | SYSTOLIC BLOOD PRESSURE: 112 MMHG | DIASTOLIC BLOOD PRESSURE: 70 MMHG | OXYGEN SATURATION: 99 % | WEIGHT: 175 LBS | TEMPERATURE: 98 F | HEART RATE: 63 BPM

## 2018-04-19 DIAGNOSIS — N89.8 VAGINAL IRRITATION: Primary | ICD-10-CM

## 2018-04-19 LAB
ALBUMIN (URINE): ABNORMAL MG/DL
APPEARANCE UR: CLEAR
BACTERIA URINE: ABNORMAL
BACTERIA, UR: ABNORMAL
BILIRUB UR QL: ABNORMAL
CASTS/LPF: ABNORMAL
CLUE CELLS: ABNORMAL
COLOR UR: YELLOW
EP/HPF: ABNORMAL
GLUCOSE URINE: ABNORMAL MG/DL
HGB UR QL: ABNORMAL
KETONES UR QL: ABNORMAL MG/DL
LEUKOCYTE ESTERASE - QUEST: ABNORMAL
MISC.: ABNORMAL
NITRITE UR QL STRIP: ABNORMAL
PH BLDA: 6 [PH] (ref 5–7)
PH UR STRIP: 6 PH (ref 5–7)
PMNS (WET PREP): ABNORMAL
RBC, UR MICRO: ABNORMAL (ref ?–2)
SP. GRAVITY: <=1.005
TRICHOMONAS VAGINALS: ABNORMAL
UROBILINOGEN UR QL STRIP: 0.2 EU/DL (ref 0.2–1)
WBC, UR MICRO: ABNORMAL (ref ?–2)
YEAST CELLS: ABNORMAL

## 2018-04-19 PROCEDURE — 87210 SMEAR WET MOUNT SALINE/INK: CPT | Performed by: PHYSICIAN ASSISTANT

## 2018-04-19 PROCEDURE — 81001 URINALYSIS AUTO W/SCOPE: CPT | Performed by: PHYSICIAN ASSISTANT

## 2018-04-19 PROCEDURE — 87070 CULTURE OTHR SPECIMN AEROBIC: CPT | Mod: 90 | Performed by: PHYSICIAN ASSISTANT

## 2018-04-19 PROCEDURE — 99213 OFFICE O/P EST LOW 20 MIN: CPT | Performed by: PHYSICIAN ASSISTANT

## 2018-04-19 NOTE — MR AVS SNAPSHOT
After Visit Summary   4/19/2018    Marietta Cruz    MRN: [id number]           Patient Information     Date Of Birth          1980        Visit Information        Provider Department      4/19/2018 5:15 PM Nichelle Luna PA [facility]        Today's Diagnoses     Vaginal irritation    -  1       Follow-ups after your visit        Additional Services     OB/GYN REFERRAL       Your provider has referred you to:  [facility] [place] [phone]   [email or web address]    Please be aware that coverage of these services is subject to the terms and limitations of your health insurance plan.  Call member services at your health plan with any benefit or coverage questions.      Please bring the following to your appointment:  >>   Any x-rays, CTs or MRIs which have been performed.  Contact the facility where they were done to arrange for  prior to your scheduled appointment.  Any new CT, MRI or other procedures ordered by your specialist must be performed at a [place] facility or coordinated by your clinic's referral office.    >>   List of current medications   >>   This referral request   >>   Any documents/labs given to you for this referral                  Who to contact     If you have questions or need follow up information about today's clinic visit or your schedule please contact [facility] directly at [phone].  Normal or non-critical lab and imaging results will be communicated to you by MyChart, letter or phone within 4 business days after the clinic has received the results. If you do not hear from us within 7 days, please contact the clinic through MyChart or phone. If you have a critical or abnormal lab result, we will notify you by phone as soon as possible.  Submit refill requests through [employer] or call your pharmacy and they will forward the refill request to us. Please allow 3 business  days for your refill to be completed.          Additional Information About Your Visit        MyChart Information     [employer] gives you secure access to your electronic health record. If you see a primary care provider, you can also send messages to your care team and make appointments. If you have questions, please call your primary care clinic.  If you do not have a primary care provider, please call [phone] and they will assist you.        Care EveryWhere ID     This is your Care EveryWhere ID. This could be used by other organizations to access your [place] medical records  [id number]        Your Vitals Were     Pulse Temperature Pulse Oximetry BMI (Body Mass Index)          63 98  F (36.7  C) (Oral) 99% 24.41 kg/m2         Blood Pressure from Last 3 Encounters:   04/19/18 112/70   12/07/17 120/70   05/11/17 98/72    Weight from Last 3 Encounters:   04/19/18 79.4 kg (175 lb)   12/07/17 80.5 kg (177 lb 6.4 oz)   05/11/17 82.6 kg (182 lb)              We Performed the Following     A WET PREP (BFP)     Genital Culture Aerobic Bacterial     HCL  Urinalysis, Routine (BFP)     OB/GYN REFERRAL        Primary Care Provider Office Phone # Fax #    Spencer Lott [phone] [address] [address]        Equal Access to Services     [facility] AMITA : Hadii aad ku hadasho Soomaali, waaxda luqadaha, qaybta kaalmada adeegyada, brian hobbs . So [facility] [phone].    ATENCIÓN: Si habla español, tiene a hernadez disposición servicios gratuitos de asistencia lingüística. Llame al [phone].    We comply with applicable federal civil rights laws and [place] laws. We do not discriminate on the basis of race, color, national origin, age, disability, sex, sexual orientation, or gender identity.            Thank you!     Thank you for choosing [facility]  for your care. Our goal is always to provide you with excellent  care. Hearing back from our patients is one way we can continue to improve our services. Please take a few minutes to complete the written survey that you may receive in the mail after your visit with us. Thank you!             Your Updated Medication List - Protect others around you: Learn how to safely use, store and throw away your medicines at www.disposemymeds.org.          This list is accurate as of 4/19/18 11:59 PM.  Always use your most recent med list.                   Brand Name Dispense Instructions for use Diagnosis    norethindrone 0.35 MG per tablet    MICRONOR    84 tablet    Take 1 tablet (0.35 mg) by mouth daily    Surveillance of previously prescribed contraceptive pill

## 2018-04-19 NOTE — PROGRESS NOTES
"Chief Complaint   Patient presents with     Vaginal Problem     red, sore and discharge x1 week     SUBJECTIVE  Marietta Cruz who presents with vaginal symptoms. iritiation 2 weeks  New partner had \"itching\" after intercourse    Fullness - increased fiber     ROS  Menstrual pattern: Monthly    Positive Urinary Sx: none  Negative Urinary Sx: dysuria, frequency, hematuria, fever and chills    OBJECTIVE  /70 (BP Location: Right arm, Patient Position: Sitting, Cuff Size: Adult Regular)  Pulse 63  Temp 98  F (36.7  C) (Oral)  Wt 79.4 kg (175 lb)  SpO2 99%  BMI 24.41 kg/m2  Patient is pleasant appearing 37 year old female , in no acute distress. Vitals noted  Head: Normocephalic, atraumatic.  Abdomen:   Bowel sounds normal. Soft,  not distended, no masses, no hepatosplenomegaly, No flank tenderness, abdomen non-tender.  Pelvic examination: External genitalia and vagina normal.  positive findings:  vaginal discharge - moderate and brown      Labs Resulted Today:   Results for orders placed or performed in visit on 04/19/18   HCL  Urinalysis, Routine (BFP)   Result Value Ref Range    Color Urine Yellow     Appearance Urine Clear     Glucose Urine Neg neg mg/dL    Bilirubin Urine Neg neg    Ketones Urine Neg neg mg/dL    Specific Gravity <=1.005     Blood Urine Small (A) neg    pH Urine 6.0 5.0 - 7.0 pH    Albumin Urine Neg neg - neg mg/dL    Urobilinogen Urine 0.2 0.2 - 1.0 EU/dL    Nitrite Urine Neg NEG    Leukocyte Esterase Neg neg - neg    Wbc, Urine Micro Neg neg - 2    RBC Micro Urine 0-2 neg - 2    EP/HPF Few     Bacteria Urine Neg neg - neg    Casts/LPF Neg     Miscellaneous Neg    A WET PREP (BFP)   Result Value Ref Range    Trichomonas Vaginals Neg     yeast cells Neg     PMNs Wet Prep Few     Clue cells Neg     Bacteria Urine Mod (A)     pH Arterial 6.0 5.0 - 7.0         ASSESSMENT/PLAN:    (N89.8) Vaginal irritation  (primary encounter diagnosis)  Plan: HCL  Urinalysis, Routine (BFP), A WET PREP      "    (BFP), Genital Culture Aerobic Bacterial,         OB/GYN REFERRAL     Genital culture pending  Will have her see OB with > 4th vaginal infection this year  Abstain  from coitus          Return to clinic if sx persist after conservative treatment or after the course of prescriptions.    Nichelle Luna

## 2018-04-23 LAB — CULTURE - QUEST: NORMAL

## 2018-09-30 ENCOUNTER — HEALTH MAINTENANCE LETTER (OUTPATIENT)
Age: 38
End: 2018-09-30

## 2019-08-12 ENCOUNTER — HOSPITAL ENCOUNTER (OUTPATIENT)
Dept: MAMMOGRAPHY | Facility: CLINIC | Age: 39
Discharge: HOME | End: 2019-08-12
Attending: OBSTETRICS & GYNECOLOGY | Admitting: OBSTETRICS & GYNECOLOGY
Payer: COMMERCIAL

## 2019-08-12 DIAGNOSIS — Z12.31 VISIT FOR SCREENING MAMMOGRAM: ICD-10-CM

## 2019-08-12 PROCEDURE — 77063 BREAST TOMOSYNTHESIS BI: CPT

## 2019-11-03 ENCOUNTER — HEALTH MAINTENANCE LETTER (OUTPATIENT)
Age: 39
End: 2019-11-03

## 2020-02-10 ENCOUNTER — HEALTH MAINTENANCE LETTER (OUTPATIENT)
Age: 40
End: 2020-02-10

## 2020-08-31 ENCOUNTER — HOSPITAL ENCOUNTER (OUTPATIENT)
Dept: MAMMOGRAPHY | Facility: CLINIC | Age: 40
Discharge: HOME | End: 2020-08-31
Attending: OBSTETRICS & GYNECOLOGY | Admitting: OBSTETRICS & GYNECOLOGY
Payer: COMMERCIAL

## 2020-08-31 DIAGNOSIS — Z12.31 VISIT FOR SCREENING MAMMOGRAM: ICD-10-CM

## 2020-08-31 PROCEDURE — 77067 SCR MAMMO BI INCL CAD: CPT

## 2020-11-16 ENCOUNTER — HEALTH MAINTENANCE LETTER (OUTPATIENT)
Age: 40
End: 2020-11-16

## 2021-08-20 DIAGNOSIS — Z00.00 LABORATORY EXAMINATION ORDERED AS PART OF A ROUTINE GENERAL MEDICAL EXAMINATION: Primary | ICD-10-CM

## 2021-09-13 ENCOUNTER — HOSPITAL ENCOUNTER (OUTPATIENT)
Dept: MAMMOGRAPHY | Facility: CLINIC | Age: 41
Discharge: HOME | End: 2021-09-13
Attending: OBSTETRICS & GYNECOLOGY | Admitting: OBSTETRICS & GYNECOLOGY
Payer: COMMERCIAL

## 2021-09-13 DIAGNOSIS — Z12.31 SCREENING MAMMOGRAM FOR HIGH-RISK PATIENT: ICD-10-CM

## 2021-09-13 PROCEDURE — 77063 BREAST TOMOSYNTHESIS BI: CPT

## 2021-09-18 ENCOUNTER — HEALTH MAINTENANCE LETTER (OUTPATIENT)
Age: 41
End: 2021-09-18

## 2022-01-08 ENCOUNTER — HEALTH MAINTENANCE LETTER (OUTPATIENT)
Age: 42
End: 2022-01-08

## 2022-09-07 ENCOUNTER — TELEPHONE (OUTPATIENT)
Dept: MIDWIFE SERVICES | Facility: CLINIC | Age: 42
End: 2022-09-07

## 2022-09-07 DIAGNOSIS — Z30.41 ENCOUNTER FOR SURVEILLANCE OF CONTRACEPTIVE PILLS: Primary | ICD-10-CM

## 2022-09-07 RX ORDER — ACETAMINOPHEN AND CODEINE PHOSPHATE 120; 12 MG/5ML; MG/5ML
0.35 SOLUTION ORAL DAILY
Qty: 56 TABLET | Refills: 0 | Status: SHIPPED | OUTPATIENT
Start: 2022-09-07 | End: 2022-09-20

## 2022-09-07 NOTE — TELEPHONE ENCOUNTER
Patient called. Will be out of her birth control pills at the end of this week. Unable to get in to see her primary provider for refills until later this month. Patient very happy with this form of birth control. No contraindications. Will fill until patient can get in to see primary provider.      BEA Francis, CNM

## 2022-09-19 ENCOUNTER — ANCILLARY PROCEDURE (OUTPATIENT)
Dept: MAMMOGRAPHY | Facility: CLINIC | Age: 42
End: 2022-09-19
Attending: PHYSICIAN ASSISTANT
Payer: COMMERCIAL

## 2022-09-19 DIAGNOSIS — Z12.31 VISIT FOR SCREENING MAMMOGRAM: ICD-10-CM

## 2022-09-19 PROCEDURE — 77067 SCR MAMMO BI INCL CAD: CPT | Performed by: STUDENT IN AN ORGANIZED HEALTH CARE EDUCATION/TRAINING PROGRAM

## 2022-09-19 PROCEDURE — 77063 BREAST TOMOSYNTHESIS BI: CPT | Performed by: STUDENT IN AN ORGANIZED HEALTH CARE EDUCATION/TRAINING PROGRAM

## 2022-09-20 ENCOUNTER — OFFICE VISIT (OUTPATIENT)
Dept: FAMILY MEDICINE | Facility: CLINIC | Age: 42
End: 2022-09-20

## 2022-09-20 VITALS
BODY MASS INDEX: 23.13 KG/M2 | WEIGHT: 165.2 LBS | TEMPERATURE: 97 F | SYSTOLIC BLOOD PRESSURE: 102 MMHG | RESPIRATION RATE: 20 BRPM | HEIGHT: 71 IN | HEART RATE: 76 BPM | DIASTOLIC BLOOD PRESSURE: 80 MMHG

## 2022-09-20 DIAGNOSIS — Z30.41 ENCOUNTER FOR SURVEILLANCE OF CONTRACEPTIVE PILLS: ICD-10-CM

## 2022-09-20 DIAGNOSIS — G43.109 MIGRAINE AURA WITHOUT HEADACHE: ICD-10-CM

## 2022-09-20 DIAGNOSIS — Z12.4 SCREENING FOR CERVICAL CANCER: ICD-10-CM

## 2022-09-20 DIAGNOSIS — Z01.419 ENCOUNTER FOR GYNECOLOGICAL EXAMINATION WITHOUT ABNORMAL FINDING: Primary | ICD-10-CM

## 2022-09-20 DIAGNOSIS — Z13.220 SCREENING CHOLESTEROL LEVEL: ICD-10-CM

## 2022-09-20 DIAGNOSIS — K62.5 BRBPR (BRIGHT RED BLOOD PER RECTUM): ICD-10-CM

## 2022-09-20 DIAGNOSIS — Z13.1 SCREENING FOR DIABETES MELLITUS: ICD-10-CM

## 2022-09-20 DIAGNOSIS — Z11.3 SCREEN FOR STD (SEXUALLY TRANSMITTED DISEASE): ICD-10-CM

## 2022-09-20 LAB — GLUCOSE SERPL-MCNC: 88 MG/DL (ref 60–99)

## 2022-09-20 PROCEDURE — 36415 COLL VENOUS BLD VENIPUNCTURE: CPT | Performed by: PHYSICIAN ASSISTANT

## 2022-09-20 PROCEDURE — 82947 ASSAY GLUCOSE BLOOD QUANT: CPT | Performed by: PHYSICIAN ASSISTANT

## 2022-09-20 PROCEDURE — 87491 CHLMYD TRACH DNA AMP PROBE: CPT | Mod: 90 | Performed by: PHYSICIAN ASSISTANT

## 2022-09-20 PROCEDURE — 80061 LIPID PANEL: CPT | Performed by: PHYSICIAN ASSISTANT

## 2022-09-20 PROCEDURE — 87591 N.GONORRHOEAE DNA AMP PROB: CPT | Mod: 90 | Performed by: PHYSICIAN ASSISTANT

## 2022-09-20 PROCEDURE — 99386 PREV VISIT NEW AGE 40-64: CPT | Performed by: PHYSICIAN ASSISTANT

## 2022-09-20 PROCEDURE — 87624 HPV HI-RISK TYP POOLED RSLT: CPT | Mod: 90 | Performed by: PHYSICIAN ASSISTANT

## 2022-09-20 PROCEDURE — 88142 CYTOPATH C/V THIN LAYER: CPT | Mod: 90 | Performed by: PHYSICIAN ASSISTANT

## 2022-09-20 RX ORDER — ACETAMINOPHEN AND CODEINE PHOSPHATE 120; 12 MG/5ML; MG/5ML
0.35 SOLUTION ORAL DAILY
Qty: 84 TABLET | Refills: 4 | Status: SHIPPED | OUTPATIENT
Start: 2022-09-20

## 2022-09-20 NOTE — PROGRESS NOTES
SUBJECTIVE:   CC: Marietta Cruz is an 42 year old woman who presents for preventive health visit.           Patient has been advised of split billing requirements and indicates understanding: Yes  HPI    Migraine with aura about once a year  Unknown triggers  Just the aura.     Micronor - no period    Long term partner, not , desires Chl/Jose F testing today. Declines other STD testing.         Today's PHQ-2 Score:   PHQ-2 ( 1999 Pfizer) 9/20/2022   Q1: Little interest or pleasure in doing things 0   Q2: Feeling down, depressed or hopeless 0   PHQ-2 Score 0       Abuse: Current or Past (Physical, Sexual or Emotional) - No  Do you feel safe in your environment? Yes    Breast cancer screening discussion: dense breasts - will check ins. Ok for every 2 years, will defer to pt preference    History of abnormal Pap smear: YES - updated in Problem List and Health Maintenance accordingly    Reviewed orders with patient.  Reviewed health maintenance and updated orders accordingly - Yes      Lab work is in process  Labs reviewed in EPIC  BP Readings from Last 3 Encounters:   09/20/22 102/80   04/19/18 112/70   12/07/17 120/70    Wt Readings from Last 3 Encounters:   09/20/22 74.9 kg (165 lb 3.2 oz)   04/19/18 79.4 kg (175 lb)   12/07/17 80.5 kg (177 lb 6.4 oz)                  Patient Active Problem List   Diagnosis     Health Care Home     ACP (advance care planning)     Migraine aura without headache     Past Surgical History:   Procedure Laterality Date     LEEP TX, CERVICAL  2005     RW OTHER (ABSTRACTED)  1999 & 2001    wisdom teeth       Social History     Tobacco Use     Smoking status: Never Smoker     Smokeless tobacco: Never Used   Substance Use Topics     Alcohol use: Yes     Alcohol/week: 0.0 standard drinks     Comment: 1 per month     Family History   Problem Relation Age of Onset     Hypertension Mother      Respiratory Father         history asthma     Atrial fibrillation Father 66     Cancer Maternal  "Grandmother         uterine?     Other - See Comments Maternal Grandmother         Mult. My.      Diabetes Maternal Grandfather      Heart Disease Maternal Grandfather      Osteoarthritis Paternal Grandmother      Other - See Comments Paternal Grandmother         pacemaker     Cancer Paternal Aunt 50        breast         Current Outpatient Medications   Medication Sig Dispense Refill     norethindrone (MICRONOR) 0.35 MG tablet Take 1 tablet (0.35 mg) by mouth daily 84 tablet 4     Allergies   Allergen Reactions     No Known Allergies      Recent Labs   Lab Test 12/01/17  0717 04/17/17  1609 06/30/15  0722   LDL 78  --  64   HDL 52  --  64   TRIG 97  --  133   ALT  --  21  --    CR  --  0.98  --    GFRESTIMATED  --  74  --    POTASSIUM  --  3.9  --    TSH  --  1.80  --                   PAP / HPV 6/15/2004   PAP (Historical) WNL           Past Medical History:   Diagnosis Date     NO ACTIVE PROBLEMS       Past Surgical History:   Procedure Laterality Date     LEEP TX, CERVICAL  2005     RW OTHER (ABSTRACTED)  1999 & 2001    wisdom teeth       Review of Systems  CONSTITUTIONAL: NEGATIVE for fever, chills, change in weight  INTEGUMENTARU/SKIN: amall new bump knee 2 days  EYES: NEGATIVE for vision changes or irritation  ENT: NEGATIVE for ear, mouth and throat problems  RESP: NEGATIVE for significant cough or SOB  BREAST: NEGATIVE for masses, tenderness or discharge  CV: NEGATIVE for chest pain, palpitations or peripheral edema  GI: occ BRB with straining  : NEGATIVE for unusual urinary or vaginal symptoms. Periods are regular.  MUSCULOSKELETAL: NEGATIVE for significant arthralgias or myalgia  NEURO: NEGATIVE for weakness, dizziness or paresthesias  PSYCHIATRIC: NEGATIVE for changes in mood or affect     OBJECTIVE:   /80 (BP Location: Left arm, Patient Position: Chair, Cuff Size: Adult Regular)   Pulse 76   Temp 97  F (36.1  C) (Temporal)   Resp 20   Ht 1.81 m (5' 11.25\")   Wt 74.9 kg (165 lb 3.2 oz)   BMI " 22.88 kg/m    Physical Exam  GENERAL: healthy, alert and no distress  EYES: Eyes grossly normal to inspection, PERRL and conjunctivae and sclerae normal  HENT: ear canals and TM's normal, nose and mouth without ulcers or lesions  NECK: no adenopathy, no asymmetry, masses, or scars and thyroid normal to palpation  RESP: lungs clear to auscultation - no rales, rhonchi or wheezes  BREAST: normal without masses, tenderness or nipple discharge and no palpable axillary masses or adenopathy  CV: regular rate and rhythm, normal S1 S2, no S3 or S4, no murmur, click or rub, no peripheral edema and peripheral pulses strong  ABDOMEN: soft, nontender, no hepatosplenomegaly, no masses and bowel sounds normal   (female): normal female external genitalia, normal urethral meatus, vaginal mucosa pink, moist, well rugated, and normal cervix/adnexa/uterus without masses or discharge  Rectal exam: small external non-thrombosed hemorrhoid SHINE nl   MS: no gross musculoskeletal defects noted, no edema  SKIN: no suspicious lesions or rashes  NEURO: Normal strength and tone, mentation intact and speech normal  PSYCH: mentation appears normal, affect normal/bright    Diagnostic Test Results:  Labs reviewed in Epic    ASSESSMENT/PLAN:   Marietta was seen today for physical.    Diagnoses and all orders for this visit:    Encounter for gynecological examination without abnormal finding  -     VENOUS COLLECTION  -     Lipid Panel ([facility])  -     Glucose Fasting ([facility])  -     ThinPrep Pap and HPV mRna E6/E7 ([facility])    Encounter for surveillance of contraceptive pills  -     norethindrone (MICRONOR) 0.35 MG tablet; Take 1 tablet (0.35 mg) by mouth daily    Screening cholesterol level  -     VENOUS COLLECTION  -     Lipid Panel ([facility])    Screening for diabetes mellitus  -     VENOUS COLLECTION  -     Glucose Fasting ([facility])    Screening for cervical cancer  -     ThinPrep Pap and HPV mRna E6/E7 ([facility])    Screen for STD (sexually transmitted disease)  -      "Chlam Trach/N. Gonorrhoeae RNA TMA([facility]    Migraine aura without headache    BRBPR (bright red blood per rectum)  No indication for colonoscopy   Advised inc. Fiber, softer stools  Will refer if worsening.       Patient has been advised of split billing requirements and indicates understanding: Yes    COUNSELING:  Reviewed preventive health counseling, as reflected in patient instructions    Estimated body mass index is 22.88 kg/m  as calculated from the following:    Height as of this encounter: 1.81 m (5' 11.25\").    Weight as of this encounter: 74.9 kg (165 lb 3.2 oz).        She reports that she has never smoked. She has never used smokeless tobacco.      Counseling Resources:  ATP IV Guidelines  Pooled Cohorts Equation Calculator  Breast Cancer Risk Calculator  BRCA-Related Cancer Risk Assessment: FHS-7 Tool  FRAX Risk Assessment  ICSI Preventive Guidelines  Dietary Guidelines for Americans, 2010  USDA's MyPlate  ASA Prophylaxis  Lung CA Screening    CHARLIE Roldan  [facility]    "

## 2022-09-20 NOTE — NURSING NOTE
Marietta Cruz is here for a CPX.    Pre-visit planning  Immunizations -up to date  Colonoscopy -  Mammogram -  Asthma test --  PHQ9 -  MIRI 7 -      Questioned patient about current smoking habits.  Pt. has never smoked.  Body mass index is 22.88 kg/m .  PULSE regular  My Chart: active  CLASSIFICATION OF OVERWEIGHT AND OBESITY BY BMI                        Obesity Class           BMI(kg/m2)  Underweight                                    < 18.5  Normal                                         18.5-24.9  Overweight                                     25.0-29.9  OBESITY                     I                  30.0-34.9                             II                 35.0-39.9  EXTREME OBESITY             III                >40                            Patient's  BMI Body mass index is 22.88 kg/m .            The patient has verbalized that it is ok to leave a detailed voice message on the patient's cell phone with results/recommendations from this visit.

## 2022-09-21 LAB
CHOLEST SERPL-MCNC: 158 MG/DL (ref 0–199)
CHOLEST/HDLC SERPL: 3 {RATIO} (ref 0–5)
HDLC SERPL-MCNC: 60 MG/DL (ref 40–150)
LDLC SERPL CALC-MCNC: 79 MG/DL (ref 0–130)
TRIGL SERPL-MCNC: 94 MG/DL (ref 0–149)

## 2022-09-25 LAB
CHLAMYDIA TRACHOMATIS RNA, TMA - QUEST: NOT DETECTED
CLINICAL HISTORY - QUEST: ABNORMAL
COMMENT - QUEST: ABNORMAL
CYTOTECHNOLOGIST - QUEST: ABNORMAL
DESCRIPTIVE DIAGNOSIS - QUEST: ABNORMAL
HPV MRNA E6/E7: DETECTED
INFECTION - QUEST: ABNORMAL
LAST PAP DX - QUEST: ABNORMAL
LMP - QUEST: ABNORMAL
NEISSERIA GONORRHOEAE RNA TMA: NOT DETECTED
PATHOLOGIST - QUEST: ABNORMAL
PREV BX DX - QUEST: ABNORMAL
SEE NOTE - QUEST: NORMAL
SOURCE: ABNORMAL
STATEMENT OF ADEQUACY - QUEST: ABNORMAL

## 2022-09-26 ENCOUNTER — TELEPHONE (OUTPATIENT)
Dept: FAMILY MEDICINE | Facility: CLINIC | Age: 42
End: 2022-09-26

## 2022-09-26 ENCOUNTER — MYC MEDICAL ADVICE (OUTPATIENT)
Dept: FAMILY MEDICINE | Facility: CLINIC | Age: 42
End: 2022-09-26

## 2022-09-26 DIAGNOSIS — R87.811 VAGINAL HIGH RISK HPV DNA TEST POSITIVE: Primary | ICD-10-CM

## 2022-09-27 NOTE — TELEPHONE ENCOUNTER
I talked with patient. Patient knows the referral with office note, labs and pap has been faxed to     [facility]  [address] [address] [address] [phone] -- appt line  [phone] -- fax    Patient will wait for a call from [facility] to schedule her consult.

## 2022-09-27 NOTE — TELEPHONE ENCOUNTER
Sent mychart  Pt needs colopo with [facility] please fax my CPE and labs to [facility]  She will schedule appt    Nichelle Luna PA-C  9/26/2022

## 2022-10-11 LAB — PAP SMEAR - HIM PATIENT REPORTED: NEGATIVE

## 2022-11-20 ENCOUNTER — HEALTH MAINTENANCE LETTER (OUTPATIENT)
Age: 42
End: 2022-11-20

## 2023-10-04 DIAGNOSIS — Z30.41 ENCOUNTER FOR SURVEILLANCE OF CONTRACEPTIVE PILLS: ICD-10-CM

## 2023-10-04 RX ORDER — ACETAMINOPHEN AND CODEINE PHOSPHATE 120; 12 MG/5ML; MG/5ML
0.35 SOLUTION ORAL DAILY
Qty: 84 TABLET | Refills: 4 | COMMUNITY
Start: 2023-10-04

## 2023-10-04 NOTE — TELEPHONE ENCOUNTER
Marietta Cruz is requesting a refill of:    Refused Prescriptions:                       Disp   Refills    norethindrone (MICRONOR) 0.35 MG tablet [P*84 tab*4        Sig: TAKE ONE TABLET BY MOUTH ONCE DAILY  Refused By: JAVAD LAMB  Reason for Refusal: Patient needs appointment    Pt due for FASTING CPX

## 2023-10-19 LAB — PAP SMEAR - HIM PATIENT REPORTED: NEGATIVE

## 2023-10-24 ENCOUNTER — ANCILLARY PROCEDURE (OUTPATIENT)
Dept: MAMMOGRAPHY | Facility: CLINIC | Age: 43
End: 2023-10-24
Payer: COMMERCIAL

## 2023-10-24 DIAGNOSIS — Z12.31 VISIT FOR SCREENING MAMMOGRAM: ICD-10-CM

## 2023-10-24 PROCEDURE — 77063 BREAST TOMOSYNTHESIS BI: CPT | Mod: TC | Performed by: RADIOLOGY

## 2023-10-24 PROCEDURE — 77067 SCR MAMMO BI INCL CAD: CPT | Mod: TC | Performed by: RADIOLOGY

## 2023-11-13 ENCOUNTER — OFFICE VISIT (OUTPATIENT)
Dept: FAMILY MEDICINE | Facility: CLINIC | Age: 43
End: 2023-11-13

## 2023-11-13 VITALS
WEIGHT: 165 LBS | OXYGEN SATURATION: 97 % | BODY MASS INDEX: 23.1 KG/M2 | SYSTOLIC BLOOD PRESSURE: 116 MMHG | TEMPERATURE: 97.8 F | DIASTOLIC BLOOD PRESSURE: 76 MMHG | HEART RATE: 64 BPM | HEIGHT: 71 IN

## 2023-11-13 DIAGNOSIS — Z71.1 CONCERN ABOUT SKIN DISEASE WITHOUT DIAGNOSIS: ICD-10-CM

## 2023-11-13 DIAGNOSIS — Z00.00 WELL WOMAN EXAM WITHOUT GYNECOLOGICAL EXAM: Primary | ICD-10-CM

## 2023-11-13 PROCEDURE — 99396 PREV VISIT EST AGE 40-64: CPT

## 2023-11-13 NOTE — PATIENT INSTRUCTIONS
It was nice meeting you today Marietta.     I have placed the dermatology referral below, someone should call you within a week to get this scheduled.     We can schedule a colonoscopy after you turn 44.    Please let me know if you have any questions or concerns.

## 2023-11-13 NOTE — PROGRESS NOTES
SUBJECTIVE:   CC: Marietta is an 43 year old who presents for preventive health visit.     HPI    Marietta presents for a fasting physical. She does not have any concerns, other than she is interested in establishing care with a dermatology clinic to have annual skin checks. She does not have any concerning moles or lesions and does not have a personal or family history of skin cancer but now that she is getting older is interested in having skin checks completed once a year. She recently saw her OBGYN about a month ago for her annual exam and had fasting lab work completed; forms show A1C 5.3 and lipid panel was within normal limits.     Past medical history and daily medications reviewed. Reviewed past medical history, surgical history, family history, and social history below.     Social history:  -Diet: Tries to eat a well balanced diet, eats a variety of proteins, tries to get in a good amount of fruits and veggies, yogurt for calcium source.   -Water: Drink a good amount of water throughout the day.   -Exercise: Active, goes to the gym once a day.   -Sleep: No concerns, feels well rested in the moring.   -Daily vitamins: Started taking vitamin D, also has a multivitamin.   -Dentist: Twice a year.   -Eye doctor: Goes every 2 years.   -Smoking: None.   -Alcohol: Rarely.   -LMP: No cycles, takes birth control (Norethindrone) consistently, skips placebo pills.      Screenings:  -Immunizations: Up to date on flu shot, encouraged COVID booster from local pharmacy.   -Lab work: Had A1C and lipid completed at [facility], offered CBC, CMP, TSH, T4, and hep C screening labs, patient declines at this time.   -Pap smear: Last one completed 9/20/22 and was positive for HPV but pap was normal. She was referred to OBGYN specialists who repeated the pap and tested for high risk HPV strains (16 and 18) and she was negative, then she did the cotesting again in one year and was HPV positive again and then she had a colposcopy done which  was negative. She is due for a repeat pap next year.   -Mammogram: Up to date, last completed 10/24/23.  -Colonoscopy: Due starting at age 45.     Have you ever done Advance Care Planning? (For example, a Health Directive, POLST, or a discussion with a medical provider or your loved ones about your wishes): No, advance care planning information given to patient to review.  Patient declined advance care planning discussion at this time.    Social History     Tobacco Use     Smoking status: Never     Passive exposure: Never     Smokeless tobacco: Never   Substance Use Topics     Alcohol use: Yes     Alcohol/week: 1.0 standard drink of alcohol     Types: 1 Standard drinks or equivalent per week     Comment: 1 per month     Reviewed orders with patient.  Reviewed health maintenance and updated orders accordingly - Yes    Breast Cancer Screening: Any new diagnosis of family breast, ovarian, or bowel cancer? No    FHS-7:       9/13/2021     8:37 AM 9/19/2022     1:26 PM 10/24/2023     7:57 AM   Breast CA Risk Assessment (FHS-7)   Did any of your first-degree relatives have breast or ovarian cancer? No No No   Did any of your relatives have bilateral breast cancer? No No No   Did any man in your family have breast cancer? No No No   Did any woman in your family have breast and ovarian cancer? No No No   Did any woman in your family have breast cancer before age 50 y? No No No   Do you have 2 or more relatives with breast and/or ovarian cancer? No No No   Do you have 2 or more relatives with breast and/or bowel cancer? No No No     Mammogram Screening - Recommend annual screening and updated Health Maintenance for mutual plan based on risk factor consideration. Pertinent mammograms are reviewed under the imaging tab.    History of abnormal Pap smear: YES - updated in Problem List and Health Maintenance accordingly.       6/15/2004    10:32 AM   PAP / HPV   PAP (Historical) WNL      Reviewed and updated as needed this visit  "by clinical staff   Tobacco  Allergies   Problems  Med Hx  Surg Hx  Fam Hx  Soc Hx      Reviewed and updated as needed this visit by Provider   Tobacco     Med Hx  Surg Hx  Fam Hx  Soc Hx       Review of Systems  CONSTITUTIONAL: NEGATIVE for fever, chills, change in weight  INTEGUMENTARU/SKIN: NEGATIVE for worrisome rashes, moles or lesions  EYES: NEGATIVE for vision changes or irritation  ENT: NEGATIVE for ear, mouth and throat problems  RESP: NEGATIVE for significant cough or SOB  BREAST: NEGATIVE for masses, tenderness or discharge  CV: NEGATIVE for chest pain, palpitations or peripheral edema  GI: NEGATIVE for nausea, abdominal pain, heartburn, or change in bowel habits  : NEGATIVE for unusual urinary or vaginal symptoms. Periods are regular.  MUSCULOSKELETAL: NEGATIVE for significant arthralgias or myalgia  NEURO: NEGATIVE for weakness, dizziness or paresthesias  PSYCHIATRIC: NEGATIVE for changes in mood or affect     OBJECTIVE:   /76 (BP Location: Right arm, Patient Position: Sitting, Cuff Size: Adult Large)   Pulse 64   Temp 97.8  F (36.6  C) (Temporal)   Ht 1.81 m (5' 11.25\")   Wt 74.8 kg (165 lb)   SpO2 97%   BMI 22.85 kg/m       Physical Exam  GENERAL: healthy, alert and no distress  EYES: Eyes grossly normal to inspection, PERRL and conjunctivae and sclerae normal  HENT: ear canals and TM's normal, nose and mouth without ulcers or lesions  NECK: no adenopathy, no asymmetry, masses, or scars and thyroid normal to palpation  RESP: lungs clear to auscultation - no rales, rhonchi or wheezes  BREAST: deferred, no concerns, recently saw OBGYN  CV: regular rate and rhythm, normal S1 S2, no S3 or S4, no murmur, click or rub, no peripheral edema and peripheral pulses strong  ABDOMEN: soft, nontender, no hepatosplenomegaly, no masses and bowel sounds normal   (female): deferred, no concerns, recently saw OBGYN  MS: no gross musculoskeletal defects noted, no edema  SKIN: no suspicious " lesions or rashes  NEURO: Normal strength and tone, mentation intact and speech normal  PSYCH: mentation appears normal, affect normal/bright    ASSESSMENT/PLAN:     (Z01.419) Well woman exam with routine gynecological exam  (primary encounter diagnosis)  Comment: Well woman, encouraged healthy eating habits, daily exercise, pap smears, mammogram, and colonoscopy screenings, etc. She will send a my chart message next year when she turns 44 for a colonoscopy referral/order.     (Z71.1) Concern about skin disease without diagnosis  Comment: Referral placed.   Plan: Adult Dermatology  Referral    COUNSELING:  Reviewed preventive health counseling, as reflected in patient instructions       Regular exercise       Healthy diet/nutrition       Vision screening       Contraception       Osteoporosis prevention/bone health       Colorectal Cancer Screening       Consider Hep C screening for all patients one time for ages 18-79 years       Advance Care Planning    She reports that she has never smoked. She has never been exposed to tobacco smoke. She has never used smokeless tobacco.            Terese Willis PA-C  [facility]

## 2023-11-13 NOTE — NURSING NOTE
Chief Complaint   Patient presents with    Physical     Goes to GYN, had fasting labs done with them     Pre-visit Screening:  Immunizations:  up to date  Colonoscopy:  NA  Mammogram: is up to date  Asthma Action Test/Plan:  NA  PHQ9:  NA  GAD7:  NA  Questioned patient about current smoking habits Pt. has never smoked.  Ok to leave detailed message on voice mail for today's visit only Yes, phone # [phone]

## 2023-11-14 ENCOUNTER — TRANSFERRED RECORDS (OUTPATIENT)
Dept: FAMILY MEDICINE | Facility: CLINIC | Age: 43
End: 2023-11-14

## 2024-04-08 ENCOUNTER — THERAPY VISIT (OUTPATIENT)
Dept: PHYSICAL THERAPY | Facility: CLINIC | Age: 44
End: 2024-04-08
Payer: COMMERCIAL

## 2024-04-08 DIAGNOSIS — G89.29 CHRONIC PAIN OF LEFT KNEE: Primary | ICD-10-CM

## 2024-04-08 DIAGNOSIS — M25.562 CHRONIC PAIN OF LEFT KNEE: Primary | ICD-10-CM

## 2024-04-08 DIAGNOSIS — M25.551 HIP PAIN, RIGHT: ICD-10-CM

## 2024-04-08 PROCEDURE — 97530 THERAPEUTIC ACTIVITIES: CPT | Mod: GP | Performed by: PHYSICAL THERAPIST

## 2024-04-08 PROCEDURE — 97110 THERAPEUTIC EXERCISES: CPT | Mod: GP | Performed by: PHYSICAL THERAPIST

## 2024-04-08 PROCEDURE — 97161 PT EVAL LOW COMPLEX 20 MIN: CPT | Mod: GP | Performed by: PHYSICAL THERAPIST

## 2024-04-08 ASSESSMENT — ACTIVITIES OF DAILY LIVING (ADL)
KNEE_ACTIVITY_OF_DAILY_LIVING_SUM: 41
SQUAT: ACTIVITY IS MINIMALLY DIFFICULT
WALK: ACTIVITY IS NOT DIFFICULT
RISE FROM A CHAIR: ACTIVITY IS NOT DIFFICULT
AS_A_RESULT_OF_YOUR_KNEE_INJURY,_HOW_WOULD_YOU_RATE_YOUR_CURRENT_LEVEL_OF_DAILY_ACTIVITY?: NEARLY NORMAL
HOW_WOULD_YOU_RATE_THE_OVERALL_FUNCTION_OF_YOUR_KNEE_DURING_YOUR_USUAL_DAILY_ACTIVITIES?: NEARLY NORMAL
SIT WITH YOUR KNEE BENT: ACTIVITY IS MINIMALLY DIFFICULT
GO DOWN STAIRS: ACTIVITY IS NOT DIFFICULT
KNEEL ON THE FRONT OF YOUR KNEE: ACTIVITY IS MINIMALLY DIFFICULT
GO UP STAIRS: ACTIVITY IS MINIMALLY DIFFICULT
STAND: ACTIVITY IS NOT DIFFICULT
LIMPING: I DO NOT HAVE THE SYMPTOM
HOW_WOULD_YOU_RATE_THE_CURRENT_FUNCTION_OF_YOUR_KNEE_DURING_YOUR_USUAL_DAILY_ACTIVITIES_ON_A_SCALE_FROM_0_TO_100_WITH_100_BEING_YOUR_LEVEL_OF_KNEE_FUNCTION_PRIOR_TO_YOUR_INJURY_AND_0_BEING_THE_INABILITY_TO_PERFORM_ANY_OF_YOUR_USUAL_DAILY_ACTIVITIES?: 65

## 2024-04-08 NOTE — PROGRESS NOTES
"PHYSICAL THERAPY EVALUATION  Type of Visit: Evaluation    See electronic medical record for Abuse and Falls Screening details.    Subjective       Presenting condition or subjective complaint: hurt left knee doing reverse lunges  Date of onset:      Relevant medical history:     Dates & types of surgery: n/a    Prior diagnostic imaging/testing results:   did an ultrasound on herself L knee - no Baker's cyst   Prior therapy history for the same diagnosis, illness or injury:   PT left knee 2012; currently seeing chiro for cervical and spine    Prior Level of Function  Transfers: Independent  Ambulation: Independent  ADL: Independent      Living Environment  Social support: Alone   Type of home: [facility]; 2-story   Stairs to enter the home: Yes   Is there a railing: Yes   Ramp: No   Stairs inside the home: Yes       Help at home: None  Equipment owned:       Employment: Yes FT sonographer - sitting/standing, right handed (right side to patient)  Hobbies/Interests:      Patient goals for therapy: get back to normal living and lunges    Patient has history of intermittent anterior right hip pain for at least 10 years, no injury.  2 weeks ago she was doing a sled push (had only done once before), and next day had significant exacerbation anterior right hip pain.  Pain lasted 4 days and was having pain with sitting especially.  Typically hip pain is very sporadic, every couple months or so.  She also has chronic history of right LBP since injury in high school when she took a \"charge\" playing basketball - fell backwards and hit her head and had right LBP.  Additionally, 3 years ago had insidious onset of intermittent right lateral thigh numbness and has continued intermittently, every month or so.  oduced with lying supine or prone (prolonged).  Pain ranges 0-3/10, right anterior hip and LB with intermittent right lateral thigh numbness.  Right anterior hip symptoms increase with sitting, any hip flexion activity, holding " "legs up; LBP increases with sitting on uneven surface, no other activities or positions  Hip symptoms relieve with moving hip and \"popping it back in\", but was not able to do with exacerbation 2 weeks ago after sled push.  LBP decreases with lying supine, but that position and prolonged pronelying can produce right lateral thigh numbness.      Patient has second concern of left knee pain that started with a was reverse lunge late January/early February.  She stopped her exercise classes and weights, but can still run and swim and walk.  Pain initially was anterior knee, but can also get swelling/stiffness posterior knee as well.  Pain ranges 0-3/10.  Symptoms increase with flexion activities - elliptical, biking, crossing legs.   Symptoms decrease with avoiding bending the knee.     Currently she goes to the gym every day for swimming, running and walking.  Prior to injury she was able to do HIIT classes and weights 3-4 days/week and run opposite days (3 miles/treadmill) .  She would like to be able to resume HIIT classes and weighs.      Objective   Posture/alignment  Sitting:  Fair  Standing:  slight left shift, decreased lumbar lordosis, post pelvic tilt    Gait  Assistive device:  none  Deviation:  minimal trunk rotation and arm swing    Lumbar AROM  Flexion:  WNL  Extension:  33%  Right sideglide:  WNL  Left sideglide:  WNL    Lumbar Dermatomes  WNL/symetrical to light touch except for L4 dermatome right, feels \"tight\"/different compared to the left    Lumbar Myotomes  WNL/symetrical    Slump test  Right  negative  Left  negative    Repeated movement testing  Symptoms prior to test movements: 0/10  Correction of sitting posture:  no effect  Prone:  0/10  REIL:  no effect, restricted ROM, increase with repetition     Hips/knees  Full AROM bilateral hips, PROM hip extension symetrical without pain.    Full painfree AROM bilateral knees.     Negative BERNY bilaterally, \"tighness\" with FADIR on the right, no pain "     Palpation:  no tenderness about left knee.  No swelling or warmth.     2-leg squat:  full, no discomfort         Assessment & Plan   CLINICAL IMPRESSIONS  Medical Diagnosis:      Treatment Diagnosis: Right hip and left knee pain   Impression/Assessment: Patient is a 43 year old female with chronic right LB, acute on chronic right anterior hip and chronic left knee complaints.  The following significant findings have been identified: Pain, Decreased activity tolerance, and Impaired posture. These impairments interfere with their ability to perform self care tasks, recreational activities, and household chores as compared to previous level of function.     Clinical Decision Making (Complexity):  Clinical Presentation: Stable/Uncomplicated  Clinical Presentation Rationale: based on medical and personal factors listed in PT evaluation  Clinical Decision Making (Complexity): Low complexity    PLAN OF CARE  Treatment Interventions:  Interventions: Neuromuscular Re-education, Therapeutic Activity, Therapeutic Exercise, Self-Care/Home Management    Long Term Goals     PT Goal 1  Goal Identifier: Sitting  Goal Description: Patient will be able to sit 45' without symptoms  Rationale: to maximize safety and independence with performance of ADLs and functional tasks;to maximize safety and independence within the home;to maximize safety and independence within the community;to maximize safety and independence with transportation;to maximize safety and independence with self cares  Target Date: 06/03/24  PT Goal 2  Goal Identifier: Home program  Goal Description: Patient will be able to resume exercise class 2-3x/week without discomfort  Rationale: to maximize safety and independence within the community;to maximize safety and independence with performance of ADLs and functional tasks  Target Date: 06/03/24      Frequency of Treatment: 1x/week for 4 weeks then 2x/month for 2 visits  Duration of Treatment: 8 weeks    Education  Assessment:        Risks and benefits of evaluation/treatment have been explained.   Patient/Family/caregiver agrees with Plan of Care.     Evaluation Time:     PT Violet, Low Complexity Minutes (24891): 25       Signing Clinician: Norma Overton PT

## 2024-04-30 ENCOUNTER — THERAPY VISIT (OUTPATIENT)
Dept: PHYSICAL THERAPY | Facility: CLINIC | Age: 44
End: 2024-04-30
Payer: COMMERCIAL

## 2024-04-30 DIAGNOSIS — M25.562 CHRONIC PAIN OF LEFT KNEE: Primary | ICD-10-CM

## 2024-04-30 DIAGNOSIS — M25.551 HIP PAIN, RIGHT: ICD-10-CM

## 2024-04-30 DIAGNOSIS — G89.29 CHRONIC PAIN OF LEFT KNEE: Primary | ICD-10-CM

## 2024-04-30 PROCEDURE — 97530 THERAPEUTIC ACTIVITIES: CPT | Mod: GP | Performed by: PHYSICAL THERAPIST

## 2024-04-30 PROCEDURE — 97110 THERAPEUTIC EXERCISES: CPT | Mod: GP | Performed by: PHYSICAL THERAPIST

## 2024-05-09 ENCOUNTER — THERAPY VISIT (OUTPATIENT)
Dept: PHYSICAL THERAPY | Facility: CLINIC | Age: 44
End: 2024-05-09
Payer: COMMERCIAL

## 2024-05-09 DIAGNOSIS — M25.551 HIP PAIN, RIGHT: ICD-10-CM

## 2024-05-09 DIAGNOSIS — G89.29 CHRONIC PAIN OF LEFT KNEE: Primary | ICD-10-CM

## 2024-05-09 DIAGNOSIS — M25.562 CHRONIC PAIN OF LEFT KNEE: Primary | ICD-10-CM

## 2024-05-09 PROCEDURE — 97110 THERAPEUTIC EXERCISES: CPT | Mod: GP | Performed by: PHYSICAL THERAPIST

## 2024-05-14 ENCOUNTER — THERAPY VISIT (OUTPATIENT)
Dept: PHYSICAL THERAPY | Facility: CLINIC | Age: 44
End: 2024-05-14
Payer: COMMERCIAL

## 2024-05-14 DIAGNOSIS — M25.562 CHRONIC PAIN OF LEFT KNEE: Primary | ICD-10-CM

## 2024-05-14 DIAGNOSIS — G89.29 CHRONIC PAIN OF LEFT KNEE: Primary | ICD-10-CM

## 2024-05-14 DIAGNOSIS — M25.551 HIP PAIN, RIGHT: ICD-10-CM

## 2024-05-14 PROCEDURE — 97140 MANUAL THERAPY 1/> REGIONS: CPT | Mod: GP | Performed by: PHYSICAL THERAPIST

## 2024-05-14 PROCEDURE — 97110 THERAPEUTIC EXERCISES: CPT | Mod: GP | Performed by: PHYSICAL THERAPIST

## 2024-05-29 ENCOUNTER — TRANSFERRED RECORDS (OUTPATIENT)
Dept: FAMILY MEDICINE | Facility: CLINIC | Age: 44
End: 2024-05-29

## 2024-06-03 ENCOUNTER — THERAPY VISIT (OUTPATIENT)
Dept: PHYSICAL THERAPY | Facility: CLINIC | Age: 44
End: 2024-06-03
Payer: COMMERCIAL

## 2024-06-03 DIAGNOSIS — G89.29 CHRONIC PAIN OF LEFT KNEE: Primary | ICD-10-CM

## 2024-06-03 DIAGNOSIS — M25.551 HIP PAIN, RIGHT: ICD-10-CM

## 2024-06-03 DIAGNOSIS — M25.562 CHRONIC PAIN OF LEFT KNEE: Primary | ICD-10-CM

## 2024-06-03 PROCEDURE — 97110 THERAPEUTIC EXERCISES: CPT | Mod: GP | Performed by: PHYSICAL THERAPIST

## 2024-06-03 PROCEDURE — 97140 MANUAL THERAPY 1/> REGIONS: CPT | Mod: GP | Performed by: PHYSICAL THERAPIST

## 2024-07-11 ENCOUNTER — MYC MEDICAL ADVICE (OUTPATIENT)
Dept: FAMILY MEDICINE | Facility: CLINIC | Age: 44
End: 2024-07-11

## 2024-07-11 DIAGNOSIS — Z12.11 SCREENING FOR COLON CANCER: Primary | ICD-10-CM

## 2024-07-29 ENCOUNTER — OFFICE VISIT (OUTPATIENT)
Dept: FAMILY MEDICINE | Facility: CLINIC | Age: 44
End: 2024-07-29

## 2024-07-29 VITALS
SYSTOLIC BLOOD PRESSURE: 116 MMHG | HEART RATE: 78 BPM | TEMPERATURE: 98 F | OXYGEN SATURATION: 98 % | BODY MASS INDEX: 22.99 KG/M2 | DIASTOLIC BLOOD PRESSURE: 72 MMHG | WEIGHT: 166 LBS

## 2024-07-29 DIAGNOSIS — R21 RASH AND NONSPECIFIC SKIN ERUPTION: ICD-10-CM

## 2024-07-29 DIAGNOSIS — K92.1 BLOOD IN STOOL: ICD-10-CM

## 2024-07-29 DIAGNOSIS — K59.09 CHRONIC CONSTIPATION: Primary | ICD-10-CM

## 2024-07-29 DIAGNOSIS — K60.2 ANAL FISSURE: ICD-10-CM

## 2024-07-29 LAB — HEMOCCULT STL QL: ABNORMAL

## 2024-07-29 PROCEDURE — 82274 ASSAY TEST FOR BLOOD FECAL: CPT

## 2024-07-29 PROCEDURE — 99213 OFFICE O/P EST LOW 20 MIN: CPT

## 2024-07-29 NOTE — PROGRESS NOTES
Assessment & Plan     Addendum (07/30/24): Patient sent my chart message stating she would like to move forward with diagnostic colonoscopy, referral has been placed to [facility] in [place].     1. Chronic constipation  - Discussed with Marietta she does have a small anal fissure on exam which likely was cause of bleeding however still needs a colonoscopy as testing today is still showing blood in stool. Given she has a long standing history of constipation even with eating high fiber foods, drinking plenty of fluids, and taking daily Ducolax recommend she see [facility] for a consult on chronic constipation. Also recommend she see [facility] to see if she has any internal hemorrhoids. Discussed she will need a diagnostic colonoscopy given she is symptomatic however she would like to contact her insurance first and check on coverage. She will let us know if covered so I can place the order. Recommend she take daily Miralax instead of Ducolax, could also try doing sitz baths if fissure becomes painful again. Return to clinic and ER precautions discussed.   - Adult GI  Referral - Consult Only - To a [facility] Location (Use POS/Location)    2. Anal fissure  - See above.     3. Blood in stool  - See above.   - FECAL COLORECTAL SCRN ([facility])  - Adult GI  Referral - Consult Only - To a [facility] Location (Use POS/Location)    4. Rash and nonspecific skin eruption  - Unclear etiology of rash around lips, given has been improving, will plan to continue to monitor and if persisting discussed with Marietta could try antibacterial (Metronidazole gel) and/or seeing dermatology again.     Follow up with [facility]. Reasons to follow-up sooner or seek emergent care reviewed.     Terese Willis PA-C  [facility]       Subjective     Marietta Cruz is a 44 year old female who presents to clinic today for the following health issues:    HPI   Chief Complaint   Patient presents with    Rash     Rash  around her mouth, this started on Tuesday, she went to dermatology and they prescribed valtrex, she now has little white spots on the inside of her mouth     Gastrointestinal Problem     Stool changes, small narrow stool, some bright red blood when wiping after a bowel movement, this occurred 2x over the last 2 weeks, no changes to her diet       Marietta presents with multiple concerns:    Chronic constipation/blood in stools: Marietta notes she has a long standing history of chronic constipation however within the last two months she has started to have anal pain with defecation and her stools have been more mucosy. She also notes within the last two weeks she has had two isolated episodes of noticing blood with wiping after having a bowel movement. She denies seeing any blood in her stool or any anal pain currently. She denies noticing any hemorrhoids however notes a few years ago was told she has them. She notes her last BM was this morning and it was normal. She denies any symptoms of fevers/chills, abdominal pain, nausea/vomiting, appetite changes, or diarrhea. She does note that she takes Ducalax every morning for the last couple of years, drinks 60-64 oz of water daily, and eats high fiber foods but still struggles with constipation. She has not yet had her first colonoscopy, wondering if she now needs a diagnostic colonoscopy. She denies any family history of colon cancer.     Rash around mouth: Marietta also notes on Tuesday last week she noticed a rash around her mouth. She saw dermatology this week who thought it might be cold sores, was given a short supply of Valtrex however Marietta notes this was not helpful. She notes the rash can be itchy and dry but is not bothering her right now. She denies using any new products recently. She has been putting vaseline on her lips which seems to help. She also notes she noticed a white spot underneath her tongue on the left side yesterday. She saw her dentist today however  they did not find anything concerning.     Daily medications reviewed.       Objective    /72 (BP Location: Right arm, Patient Position: Sitting, Cuff Size: Adult Regular)   Pulse 78   Temp 98  F (36.7  C) (Temporal)   Wt 75.3 kg (166 lb)   SpO2 98%   BMI 22.99 kg/m    Body mass index is 22.99 kg/m .    Physical Examination:  GENERAL: healthy, alert and no distress  EYES: Eyes grossly normal to inspection  HENT: mouth without ulcers or lesions  NECK: no adenopathy, no asymmetry, masses, or scars and thyroid normal to palpation  RESP: lungs clear to auscultation - no rales, rhonchi or wheezes  CV: regular rate and rhythm, normal S1 S2, no S3 or S4, no murmur, click or rub, no peripheral edema   ABDOMEN: soft, nontender, no hepatosplenomegaly, no masses and bowel sounds normal  RECTAL: inspection reveals small anal fissure at 12 o'clock position, no external hemorrhoids, digital examination reveals no visible blood, normal sphincter tone  MS: no gross musculoskeletal defects noted, no edema  SKIN: small, red patches of skin noted around borders of upper and lower lips, no visible sores, otherwise no suspicious lesions or rashes  PSYCH: mentation appears normal, affect normal/bright    Labs reviewed.  Results for orders placed or performed in visit on 07/29/24   FECAL COLORECTAL SCRN ([facility])     Status: Abnormal   Result Value Ref Range    Occult Blood pos (A) neg

## 2024-07-29 NOTE — NURSING NOTE
Chief Complaint   Patient presents with    Rash     Rash around her mouth, this started on Tuesday, she went to dermatology and they prescribed valtrex, she now has little white spots on the inside of her mouth     Gastrointestinal Problem     Stool changes, small narrow stool, some bright red blood when wiping after a bowel movement, this occurred 2x over the last 2 weeks, no changes to her diet      Pre-visit Screening:  Immunizations:  up to date  Colonoscopy:  is due and to be scheduled by patient for later completion  Mammogram: is up to date  Asthma Action Test/Plan:  NA  PHQ9:  NA  GAD7:  NA  Questioned patient about current smoking habits Pt. has never smoked.  Ok to leave detailed message on voice mail for today's visit only Yes, phone # [phone]

## 2024-08-22 ENCOUNTER — TRANSFERRED RECORDS (OUTPATIENT)
Dept: FAMILY MEDICINE | Facility: CLINIC | Age: 44
End: 2024-08-22

## 2024-09-16 NOTE — PROGRESS NOTES
DISCHARGE  Reason for Discharge: Patient has failed to schedule further appointments.    Equipment Issued: none    Discharge Plan: Patient to continue home program.   06/03/24 0500   Appointment Info   Signing clinician's name / credentials Norma Overton PT   Total/Authorized Visits 6   Visits Used 5   PT Tx Diagnosis Right hip and left knee pain   Other pertinent information *self refer   Progress Note/Certification   Therapy Frequency 1x/week for 4 weeks then 2x/month for 2 visits   Predicted Duration 8 weeks   Progress Note Due Date 06/07/24   PT Goal 1   Goal Identifier Sitting   Goal Description Patient will be able to sit 45' without symptoms   Rationale to maximize safety and independence with performance of ADLs and functional tasks;to maximize safety and independence within the home;to maximize safety and independence within the community;to maximize safety and independence with transportation;to maximize safety and independence with self cares   Target Date 06/03/24   PT Goal 2   Goal Identifier Home program   Goal Description Patient will be able to resume exercise class 2-3x/week without discomfort   Rationale to maximize safety and independence within the community;to maximize safety and independence with performance of ADLs and functional tasks   Target Date 06/03/24   Goal Progress met   Date Met 06/03/24   Subjective Report   Subjective Report Tingling right lateral thigh for about 3 hours after last visit.  Changed REIL to hands anterior - pain right buttock when doing in the a.m., not when doing rest of day.  Doing strength exercises on left (SLGM and abd/ext) every other day.  Participating in exercise class 3-4x/week, running other days, no symptoms, not needing to stop during to do REIL, but does do before and after during warmup and cooldown.  Only thing avoiding is deadlifts.  Having intermittent, not daily, minimal LBP, occasional numbness right thigh sleeping, abolishes with changing  "position, intermittent minimal right anterior hip - back to chronic \"baseline\".  Doing REIL 4x/day average.  Has not been having left knee pain, including with lunges fwd and back.   Objective Measure 1   Objective Measure Lumbar/thoracic:  pain with spring testing L3-T11, referral to right LB with T12 and L1 PA mobs, hypomobility L3-mid thoracic.   Treatment Interventions (PT)   Interventions Manual Therapy;Therapeutic Procedure/Exercise   Therapeutic Procedure/Exercise   Therapeutic Procedures: strength, endurance, ROM, flexibility minutes (59645) 30   Ther Proc 1 0/10 1)REIL and REIL with pt OP with hands anterior to shoulders - good technique, NE 2)Added 2\" blocks under hands, LBP/NW.  Patient to continue with current REIL followed by REIL with pt OP hands anterior, trial increasing to 2x10 vs 1x10 to inc rep count for the day, add blocks/books under hands after mid-day.   Ther Proc 2 SLGM left review, progressed to open clam, did x 15; OK to start small ROM on right as well, stop if sxs   Ther Proc 2 - Details SLR abd with sl ext left review, OK to start small ROM on right as well, stop if sxs   Skilled Intervention Instruction in exercise to increase ROM and strength to decrease pain and increase function   Ther Proc 3 Monster walk with green tband at ankles x 2' - vcs/vscs, add to HEP, issued green tband   Ther Proc 3 - Details Sidestep with green tband at ankles 2x25' each direction, vcs/vscs - issued for home, add to HEP   Ther Proc 4 Sliding disc fwd, side, back x 10 each right and left, vcs/vscs, cuing for hip alignment/control - add to HEP 2-3x/week, avoid sxs   Ther Proc 4 - Details Hamstring reach right and left x 10 - slight discomfort buttock/hip on right, to start trial with left for now, qod up to 2x15   Therapeutic Activity   Ther Act 1 - Details Cont using lumbar roll   Skilled Intervention Education and exercise to decrease pain and increase function   Manual Therapy   Manual Therapy: " Mobilization, MFR, MLD, friction massage minutes (19168) 8   Manual Therapy 1 PA mobs mid lumbar to T9, gr II-III, discomfort low thoracic/upper lumbar, decreased pain and increasing mobility with repetition   Skilled Intervention To improve mobility to decrease pain and increase function   Total Session Time   Timed Code Treatment Minutes 38   Total Treatment Time (sum of timed and untimed services) 38         Referring Provider:  Referred Self

## 2024-09-18 ENCOUNTER — TRANSFERRED RECORDS (OUTPATIENT)
Dept: FAMILY MEDICINE | Facility: CLINIC | Age: 44
End: 2024-09-18

## 2024-10-29 ENCOUNTER — ANCILLARY PROCEDURE (OUTPATIENT)
Dept: MAMMOGRAPHY | Facility: CLINIC | Age: 44
End: 2024-10-29
Payer: COMMERCIAL

## 2024-10-29 DIAGNOSIS — Z12.31 VISIT FOR SCREENING MAMMOGRAM: ICD-10-CM

## 2024-10-29 PROCEDURE — 77063 BREAST TOMOSYNTHESIS BI: CPT | Mod: TC | Performed by: RADIOLOGY

## 2024-10-29 PROCEDURE — 77067 SCR MAMMO BI INCL CAD: CPT | Mod: TC | Performed by: RADIOLOGY

## 2024-12-02 ENCOUNTER — HOSPITAL ENCOUNTER (OUTPATIENT)
Dept: ULTRASOUND IMAGING | Facility: CLINIC | Age: 44
Discharge: HOME | End: 2024-12-02
Attending: INTERNAL MEDICINE | Admitting: INTERNAL MEDICINE
Payer: COMMERCIAL

## 2024-12-02 DIAGNOSIS — K64.8 HEMORRHOIDS, INTERNAL: ICD-10-CM

## 2024-12-02 DIAGNOSIS — R10.11 RIGHT UPPER QUADRANT ABDOMINAL PAIN: ICD-10-CM

## 2024-12-02 DIAGNOSIS — K59.00 CONSTIPATION, UNSPECIFIED: ICD-10-CM

## 2024-12-02 DIAGNOSIS — D12.2 BENIGN NEOPLASM OF ASCENDING COLON: ICD-10-CM

## 2024-12-02 PROCEDURE — 76705 ECHO EXAM OF ABDOMEN: CPT

## 2024-12-29 ENCOUNTER — HEALTH MAINTENANCE LETTER (OUTPATIENT)
Age: 44
End: 2024-12-29

## 2025-03-10 ENCOUNTER — OFFICE VISIT (OUTPATIENT)
Dept: FAMILY MEDICINE | Facility: CLINIC | Age: 45
End: 2025-03-10

## 2025-03-10 VITALS
BODY MASS INDEX: 22.85 KG/M2 | OXYGEN SATURATION: 98 % | HEART RATE: 75 BPM | DIASTOLIC BLOOD PRESSURE: 70 MMHG | TEMPERATURE: 97.8 F | SYSTOLIC BLOOD PRESSURE: 112 MMHG | WEIGHT: 165 LBS

## 2025-03-10 DIAGNOSIS — J02.9 SORE THROAT: ICD-10-CM

## 2025-03-10 DIAGNOSIS — R05.1 ACUTE COUGH: ICD-10-CM

## 2025-03-10 DIAGNOSIS — J06.9 VIRAL URI: Primary | ICD-10-CM

## 2025-03-10 LAB
FLUAV AG UPPER RESP QL IA.RAPID: NORMAL
FLUBV AG UPPER RESP QL IA.RAPID: NORMAL
STREP A: NEGATIVE

## 2025-03-10 PROCEDURE — 87651 STREP A DNA AMP PROBE: CPT

## 2025-03-10 PROCEDURE — 3078F DIAST BP <80 MM HG: CPT

## 2025-03-10 PROCEDURE — 87804 INFLUENZA ASSAY W/OPTIC: CPT | Mod: 59

## 2025-03-10 PROCEDURE — 99213 OFFICE O/P EST LOW 20 MIN: CPT

## 2025-03-10 PROCEDURE — 87804 INFLUENZA ASSAY W/OPTIC: CPT

## 2025-03-10 PROCEDURE — 3074F SYST BP LT 130 MM HG: CPT

## 2025-03-10 NOTE — NURSING NOTE
Chief Complaint   Patient presents with    Pharyngitis     Started Tuesday night into Wednesday. Started with sore throat. Progressed now into sinus and throat congestion. Dry and productive cough. Tired but can't sleep     Pre-visit Screening:  Immunizations:  up to date  Colonoscopy:  is due and to be scheduled by patient for later completion  Mammogram: is up to date  Asthma Action Test/Plan:  mercedes  PHQ9:  na  GAD7:  mercedes  Questioned patient about current smoking habits Pt. has never smoked.  Ok to leave detailed message on voice mail for today's visit only yes, phone # [phone] (home)

## 2025-03-10 NOTE — PROGRESS NOTES
Assessment & Plan     1. Viral URI (Primary)  - Discussed with Marietta symptoms and exam are most consistent with a viral URI. Influenza and strep testing both negative. Recommend she continue to rest, push plenty of fluids, alternate Tylenol/Ibuprofen and salt water gargles for sore throat, tea with honey and Mucinex for cough, saline nasal rinses followed by Flonase NS for congestion, etc. Discussed if her symptoms do not start to improve by Friday of this week (03/14/25) that she can send my chart message and can do a trial of antibiotic to see if she is having a possible bacterial sinusitis as she is concerned she may have a sinus infection, she will keep me updated. Return to clinic and ER precautions discussed.     2. Acute cough  - See above.   - Influenza A and B ([facility])    3. Sore throat  - See above.   - Influenza A and B ([facility])  - Strep A ([facility])    Follow up as needed. Reasons to follow-up sooner or seek emergent care reviewed.     Terese Willis PA-C  [facility]       Subjective     Marietta Cruz is a 44 year old female who presents to clinic today for the following health issues:    HPI   Chief Complaint   Patient presents with    Pharyngitis     Started Tuesday night into Wednesday. Started with sore throat. Progressed now into sinus and throat congestion. Dry and productive cough. Tired but can't sleep      Marietta presents with concerns of an illness. States her symptoms started 6 days ago with a sore throat which she is still having, notes it has been waking her up in the middle of the night. Also notes over the weekend she started to have symptoms of nasal congestion, runny nose, and a mixture of a dry and productive cough. Also has been having yellow drainage over her nose, sinus pressure over her cheeks, and headaches. Denies any symptoms of fevers/chills, body aches, ear pain, SOB, wheezing, chest pain, or any GI symptoms. She has been taking Tylenol, Ibuprofen, and emergency C for  her symptoms. Does work in healthcare as an ultrasound technician. Missed work this morning.     Daily medications reviewed.       Objective    /70 (BP Location: Right arm, Patient Position: Sitting, Cuff Size: Adult Large)   Pulse 75   Temp 97.8  F (36.6  C) (Temporal)   Wt 74.8 kg (165 lb)   SpO2 98%   BMI 22.85 kg/m    Body mass index is 22.85 kg/m .    Physical Examination:  GENERAL: healthy, alert and no distress  EYES: Eyes grossly normal to inspection, PERRL and conjunctivae and sclerae normal  HENT: ear canals and TM's normal, congestion present, and mouth without ulcers or lesions  NECK: no adenopathy, no asymmetry, masses, or scars and thyroid normal to palpation  RESP: lungs clear to auscultation - no rales, rhonchi or wheezes  CV: regular rate and rhythm, normal S1 S2, no S3 or S4, no murmur, click or rub, no peripheral edema   ABDOMEN: soft and non-tender  MS: no gross musculoskeletal defects noted, no edema  SKIN: no suspicious lesions or rashes  PSYCH: mentation appears normal, affect normal/bright    Labs reviewed.  Results for orders placed or performed in visit on 03/10/25   Influenza A and B ([facility])     Status: None   Result Value Ref Range    Influenza A neg neg    Influenza B neg neg   Strep A ([facility])     Status: None   Result Value Ref Range    STREP A Negative Negative

## 2025-05-14 ENCOUNTER — TRANSFERRED RECORDS (OUTPATIENT)
Dept: FAMILY MEDICINE | Facility: CLINIC | Age: 45
End: 2025-05-14

## 2025-05-16 PROBLEM — D12.6 COLON ADENOMAS: Status: ACTIVE | Noted: 2025-05-16

## 2025-06-03 ENCOUNTER — TRANSFERRED RECORDS (OUTPATIENT)
Dept: FAMILY MEDICINE | Facility: CLINIC | Age: 45
End: 2025-06-03